# Patient Record
Sex: MALE | Race: BLACK OR AFRICAN AMERICAN | Employment: PART TIME | URBAN - METROPOLITAN AREA
[De-identification: names, ages, dates, MRNs, and addresses within clinical notes are randomized per-mention and may not be internally consistent; named-entity substitution may affect disease eponyms.]

---

## 2022-03-12 ENCOUNTER — HOSPITAL ENCOUNTER (INPATIENT)
Age: 27
LOS: 1 days | Discharge: HOME | DRG: 866 | End: 2022-03-15
Attending: EMERGENCY MEDICINE | Admitting: FAMILY MEDICINE
Payer: COMMERCIAL

## 2022-03-12 ENCOUNTER — APPOINTMENT (OUTPATIENT)
Dept: GENERAL RADIOLOGY | Age: 27
DRG: 866 | End: 2022-03-12
Attending: PHYSICIAN ASSISTANT
Payer: COMMERCIAL

## 2022-03-12 ENCOUNTER — APPOINTMENT (OUTPATIENT)
Dept: CT IMAGING | Age: 27
DRG: 866 | End: 2022-03-12
Attending: PHYSICIAN ASSISTANT
Payer: COMMERCIAL

## 2022-03-12 DIAGNOSIS — R11.2 NAUSEA AND VOMITING, INTRACTABILITY OF VOMITING NOT SPECIFIED, UNSPECIFIED VOMITING TYPE: ICD-10-CM

## 2022-03-12 DIAGNOSIS — R16.1 SPLENOMEGALY: ICD-10-CM

## 2022-03-12 DIAGNOSIS — R74.8 ELEVATED LIVER ENZYMES: ICD-10-CM

## 2022-03-12 DIAGNOSIS — B27.90 INFECTIOUS MONONUCLEOSIS WITHOUT COMPLICATION, INFECTIOUS MONONUCLEOSIS DUE TO UNSPECIFIED ORGANISM: Primary | ICD-10-CM

## 2022-03-12 DIAGNOSIS — R50.9 FEVER, UNSPECIFIED FEVER CAUSE: ICD-10-CM

## 2022-03-12 PROBLEM — R79.89 ELEVATED LFTS: Status: ACTIVE | Noted: 2022-03-12

## 2022-03-12 LAB
ALBUMIN SERPL-MCNC: 3.5 G/DL (ref 3.5–5)
ALBUMIN/GLOB SERPL: 0.9 {RATIO} (ref 1.2–3.5)
ALP SERPL-CCNC: 336 U/L (ref 50–136)
ALT SERPL-CCNC: 665 U/L (ref 12–65)
ANION GAP SERPL CALC-SCNC: 7 MMOL/L (ref 7–16)
APAP SERPL-MCNC: <10 UG/ML (ref 10–30)
AST SERPL-CCNC: 341 U/L (ref 15–37)
BACTERIA URNS QL MICRO: 0 /HPF
BASOPHILS # BLD: 0.3 K/UL (ref 0–0.2)
BASOPHILS NFR BLD: 3 % (ref 0–2)
BILIRUB SERPL-MCNC: 0.8 MG/DL (ref 0.2–1.1)
BUN SERPL-MCNC: 7 MG/DL (ref 6–23)
CALCIUM SERPL-MCNC: 9.3 MG/DL (ref 8.3–10.4)
CASTS URNS QL MICRO: NORMAL /LPF
CHLORIDE SERPL-SCNC: 99 MMOL/L (ref 98–107)
CO2 SERPL-SCNC: 29 MMOL/L (ref 21–32)
CREAT SERPL-MCNC: 0.98 MG/DL (ref 0.8–1.5)
DIFFERENTIAL METHOD BLD: ABNORMAL
EOSINOPHIL # BLD: 0 K/UL (ref 0–0.8)
EOSINOPHIL NFR BLD: 0 % (ref 0.5–7.8)
EPI CELLS #/AREA URNS HPF: 0 /HPF
ERYTHROCYTE [DISTWIDTH] IN BLOOD BY AUTOMATED COUNT: 11.3 % (ref 11.9–14.6)
GLOBULIN SER CALC-MCNC: 4.1 G/DL (ref 2.3–3.5)
GLUCOSE SERPL-MCNC: 110 MG/DL (ref 65–100)
HCT VFR BLD AUTO: 46 % (ref 41.1–50.3)
HGB BLD-MCNC: 15.8 G/DL (ref 13.6–17.2)
IMM GRANULOCYTES # BLD AUTO: 0.1 K/UL (ref 0–0.5)
IMM GRANULOCYTES NFR BLD AUTO: 1 % (ref 0–5)
LIPASE SERPL-CCNC: 85 U/L (ref 73–393)
LYMPHOCYTES # BLD: 6.4 K/UL (ref 0.5–4.6)
LYMPHOCYTES NFR BLD: 59 % (ref 13–44)
MCH RBC QN AUTO: 30.4 PG (ref 26.1–32.9)
MCHC RBC AUTO-ENTMCNC: 34.3 G/DL (ref 31.4–35)
MCV RBC AUTO: 88.5 FL (ref 79.6–97.8)
MONOCYTES # BLD: 1.1 K/UL (ref 0.1–1.3)
MONOCYTES NFR BLD: 10 % (ref 4–12)
NEUTS SEG # BLD: 2.9 K/UL (ref 1.7–8.2)
NEUTS SEG NFR BLD: 27 % (ref 43–78)
NRBC # BLD: 0 K/UL (ref 0–0.2)
PLATELET # BLD AUTO: 267 K/UL (ref 150–450)
PLATELET COMMENTS,PCOM: ADEQUATE
PMV BLD AUTO: 9.1 FL (ref 9.4–12.3)
POTASSIUM SERPL-SCNC: 4 MMOL/L (ref 3.5–5.1)
PROT SERPL-MCNC: 7.6 G/DL (ref 6.3–8.2)
RBC # BLD AUTO: 5.2 M/UL (ref 4.23–5.6)
RBC #/AREA URNS HPF: NORMAL /HPF
RBC MORPH BLD: ABNORMAL
SODIUM SERPL-SCNC: 135 MMOL/L (ref 136–145)
WBC # BLD AUTO: 10.8 K/UL (ref 4.3–11.1)
WBC MORPH BLD: ABNORMAL
WBC URNS QL MICRO: NORMAL /HPF

## 2022-03-12 PROCEDURE — 74011000636 HC RX REV CODE- 636: Performed by: EMERGENCY MEDICINE

## 2022-03-12 PROCEDURE — 81003 URINALYSIS AUTO W/O SCOPE: CPT

## 2022-03-12 PROCEDURE — 74011000258 HC RX REV CODE- 258: Performed by: EMERGENCY MEDICINE

## 2022-03-12 PROCEDURE — G0378 HOSPITAL OBSERVATION PER HR: HCPCS

## 2022-03-12 PROCEDURE — 83690 ASSAY OF LIPASE: CPT

## 2022-03-12 PROCEDURE — 71046 X-RAY EXAM CHEST 2 VIEWS: CPT

## 2022-03-12 PROCEDURE — 74011250636 HC RX REV CODE- 250/636: Performed by: PHYSICIAN ASSISTANT

## 2022-03-12 PROCEDURE — 99285 EMERGENCY DEPT VISIT HI MDM: CPT

## 2022-03-12 PROCEDURE — 74011250636 HC RX REV CODE- 250/636: Performed by: FAMILY MEDICINE

## 2022-03-12 PROCEDURE — 80143 DRUG ASSAY ACETAMINOPHEN: CPT

## 2022-03-12 PROCEDURE — 74011000636 HC RX REV CODE- 636: Performed by: PHYSICIAN ASSISTANT

## 2022-03-12 PROCEDURE — 96374 THER/PROPH/DIAG INJ IV PUSH: CPT

## 2022-03-12 PROCEDURE — 80053 COMPREHEN METABOLIC PANEL: CPT

## 2022-03-12 PROCEDURE — 74011250637 HC RX REV CODE- 250/637: Performed by: FAMILY MEDICINE

## 2022-03-12 PROCEDURE — 85025 COMPLETE CBC W/AUTO DIFF WBC: CPT

## 2022-03-12 PROCEDURE — 74177 CT ABD & PELVIS W/CONTRAST: CPT

## 2022-03-12 PROCEDURE — 96375 TX/PRO/DX INJ NEW DRUG ADDON: CPT

## 2022-03-12 PROCEDURE — 81015 MICROSCOPIC EXAM OF URINE: CPT

## 2022-03-12 RX ORDER — KETOROLAC TROMETHAMINE 15 MG/ML
15 INJECTION, SOLUTION INTRAMUSCULAR; INTRAVENOUS
Status: COMPLETED | OUTPATIENT
Start: 2022-03-12 | End: 2022-03-12

## 2022-03-12 RX ORDER — SODIUM CHLORIDE 0.9 % (FLUSH) 0.9 %
10 SYRINGE (ML) INJECTION
Status: COMPLETED | OUTPATIENT
Start: 2022-03-12 | End: 2022-03-12

## 2022-03-12 RX ORDER — ACETAMINOPHEN 325 MG/1
650 TABLET ORAL
Status: DISCONTINUED | OUTPATIENT
Start: 2022-03-12 | End: 2022-03-15 | Stop reason: HOSPADM

## 2022-03-12 RX ORDER — ONDANSETRON 4 MG/1
4 TABLET, ORALLY DISINTEGRATING ORAL
Status: DISCONTINUED | OUTPATIENT
Start: 2022-03-12 | End: 2022-03-15 | Stop reason: HOSPADM

## 2022-03-12 RX ORDER — POLYETHYLENE GLYCOL 3350 17 G/17G
17 POWDER, FOR SOLUTION ORAL DAILY PRN
Status: DISCONTINUED | OUTPATIENT
Start: 2022-03-12 | End: 2022-03-15 | Stop reason: HOSPADM

## 2022-03-12 RX ORDER — IBUPROFEN 400 MG/1
400 TABLET ORAL
Status: DISCONTINUED | OUTPATIENT
Start: 2022-03-12 | End: 2022-03-12

## 2022-03-12 RX ORDER — MORPHINE SULFATE 4 MG/ML
4 INJECTION INTRAVENOUS ONCE
Status: COMPLETED | OUTPATIENT
Start: 2022-03-12 | End: 2022-03-12

## 2022-03-12 RX ORDER — SODIUM CHLORIDE 0.9 % (FLUSH) 0.9 %
5-10 SYRINGE (ML) INJECTION EVERY 8 HOURS
Status: DISCONTINUED | OUTPATIENT
Start: 2022-03-12 | End: 2022-03-15 | Stop reason: HOSPADM

## 2022-03-12 RX ORDER — HYDROCODONE BITARTRATE AND ACETAMINOPHEN 5; 325 MG/1; MG/1
1 TABLET ORAL
Status: DISCONTINUED | OUTPATIENT
Start: 2022-03-12 | End: 2022-03-15 | Stop reason: HOSPADM

## 2022-03-12 RX ORDER — SODIUM CHLORIDE 0.9 % (FLUSH) 0.9 %
5-40 SYRINGE (ML) INJECTION EVERY 8 HOURS
Status: DISCONTINUED | OUTPATIENT
Start: 2022-03-12 | End: 2022-03-15 | Stop reason: HOSPADM

## 2022-03-12 RX ORDER — MORPHINE SULFATE 2 MG/ML
1 INJECTION, SOLUTION INTRAMUSCULAR; INTRAVENOUS
Status: DISCONTINUED | OUTPATIENT
Start: 2022-03-12 | End: 2022-03-13

## 2022-03-12 RX ORDER — ONDANSETRON 2 MG/ML
4 INJECTION INTRAMUSCULAR; INTRAVENOUS
Status: DISCONTINUED | OUTPATIENT
Start: 2022-03-12 | End: 2022-03-15 | Stop reason: HOSPADM

## 2022-03-12 RX ORDER — SODIUM CHLORIDE 0.9 % (FLUSH) 0.9 %
5-10 SYRINGE (ML) INJECTION AS NEEDED
Status: DISCONTINUED | OUTPATIENT
Start: 2022-03-12 | End: 2022-03-15 | Stop reason: HOSPADM

## 2022-03-12 RX ORDER — SODIUM CHLORIDE 0.9 % (FLUSH) 0.9 %
5-40 SYRINGE (ML) INJECTION AS NEEDED
Status: DISCONTINUED | OUTPATIENT
Start: 2022-03-12 | End: 2022-03-15 | Stop reason: HOSPADM

## 2022-03-12 RX ORDER — SODIUM CHLORIDE 9 MG/ML
125 INJECTION, SOLUTION INTRAVENOUS CONTINUOUS
Status: DISCONTINUED | OUTPATIENT
Start: 2022-03-12 | End: 2022-03-13

## 2022-03-12 RX ORDER — ACETAMINOPHEN 650 MG/1
650 SUPPOSITORY RECTAL
Status: DISCONTINUED | OUTPATIENT
Start: 2022-03-12 | End: 2022-03-15 | Stop reason: HOSPADM

## 2022-03-12 RX ORDER — ONDANSETRON 2 MG/ML
4 INJECTION INTRAMUSCULAR; INTRAVENOUS ONCE
Status: COMPLETED | OUTPATIENT
Start: 2022-03-12 | End: 2022-03-12

## 2022-03-12 RX ADMIN — SODIUM CHLORIDE 1000 ML: 900 INJECTION, SOLUTION INTRAVENOUS at 20:40

## 2022-03-12 RX ADMIN — HYDROCODONE BITARTRATE AND ACETAMINOPHEN 1 TABLET: 5; 325 TABLET ORAL at 23:05

## 2022-03-12 RX ADMIN — DIATRIZOATE MEGLUMINE AND DIATRIZOATE SODIUM 15 ML: 660; 100 LIQUID ORAL; RECTAL at 18:31

## 2022-03-12 RX ADMIN — MORPHINE SULFATE 4 MG: 4 INJECTION INTRAVENOUS at 21:01

## 2022-03-12 RX ADMIN — KETOROLAC TROMETHAMINE 15 MG: 15 INJECTION, SOLUTION INTRAMUSCULAR; INTRAVENOUS at 18:09

## 2022-03-12 RX ADMIN — SODIUM CHLORIDE 100 ML: 9 INJECTION, SOLUTION INTRAVENOUS at 19:39

## 2022-03-12 RX ADMIN — SODIUM CHLORIDE 125 ML/HR: 900 INJECTION, SOLUTION INTRAVENOUS at 22:40

## 2022-03-12 RX ADMIN — IOPAMIDOL 100 ML: 755 INJECTION, SOLUTION INTRAVENOUS at 19:39

## 2022-03-12 RX ADMIN — SODIUM CHLORIDE 1000 ML: 900 INJECTION, SOLUTION INTRAVENOUS at 18:09

## 2022-03-12 RX ADMIN — ONDANSETRON 4 MG: 2 INJECTION INTRAMUSCULAR; INTRAVENOUS at 18:09

## 2022-03-12 RX ADMIN — Medication 10 ML: at 19:39

## 2022-03-12 NOTE — ED TRIAGE NOTES
Pt states he was dx with mono on Saturday and has been vomiting and unable to keep food or liquids down since yesterday. Still having a fever and body aches. Went to  yesterday and given Zofran and NS 2000mL. Masked for triage.

## 2022-03-12 NOTE — ED PROVIDER NOTES
Patient presents to the emergency department stating that he has been feeling poorly for about 8 days. He states that he has had a fever for 1 week, sore throat and body aches for 6 days as well as headache. He was seen at a [facility] clinic and had a positive mononucleosis test about a week ago. Because he was not feeling better he went to [facility] urgent care yesterday and they retested him and he was once again positive. He states he has been having vomiting over the past 5 days numerous episodes and the urgent care yesterday gave him 2 L of fluid and a prescription for Zofran. He last took Zofran at 8:30 AM which has helped and he has not had any vomiting today but states he is having some worsening abdominal pain, headache and general body aches. Patient admits he has been taking a gram of Tylenol every 5-6 hours over the last 1 week. No past medical history on file. No past surgical history on file. No family history on file. Social History     Socioeconomic History    Marital status: SINGLE     Spouse name: Not on file    Number of children: Not on file    Years of education: Not on file    Highest education level: Not on file   Occupational History    Not on file   Tobacco Use    Smoking status: Not on file    Smokeless tobacco: Not on file   Substance and Sexual Activity    Alcohol use: Not on file    Drug use: Not on file    Sexual activity: Not on file   Other Topics Concern    Not on file   Social History Narrative    Not on file     Social Determinants of Health     Financial Resource Strain:     Difficulty of Paying Living Expenses: Not on file   Food Insecurity:     Worried About Running Out of Food in the Last Year: Not on file    Keri of Food in the Last Year: Not on file   Transportation Needs:     Lack of Transportation (Medical): Not on file    Lack of Transportation (Non-Medical):  Not on file   Physical Activity:     Days of Exercise per Week: Not on file   WTFast of Exercise per Session: Not on file   Stress:     Feeling of Stress : Not on file   Social Connections:     Frequency of Communication with Friends and Family: Not on file    Frequency of Social Gatherings with Friends and Family: Not on file    Attends Judaism Services: Not on file    Active Member of Clubs or Organizations: Not on file    Attends Club or Organization Meetings: Not on file    Marital Status: Not on file   Intimate Partner Violence:     Fear of Current or Ex-Partner: Not on file    Emotionally Abused: Not on file    Physically Abused: Not on file    Sexually Abused: Not on file   Housing Stability:     Unable to Pay for Housing in the Last Year: Not on file    Number of [place] in the Last Year: Not on file    Unstable Housing in the Last Year: Not on file         ALLERGIES: Patient has no known allergies. Review of Systems   Constitutional: Positive for chills, fatigue and fever. HENT: Positive for sore throat. Gastrointestinal: Positive for abdominal pain, nausea and vomiting. Neurological: Positive for headaches. All other systems reviewed and are negative. Vitals:    03/12/22 1712   BP: 109/71   Pulse: 100   Resp: 20   Temp: (!) 101 °F (38.3 °C)   SpO2: 95%   Weight: 62.6 kg (138 lb)   Height: 5' 10\" (1.778 m)            Physical Exam  Vitals and nursing note reviewed. Constitutional:       Appearance: Normal appearance. HENT:      Head: Normocephalic and atraumatic. Right Ear: Tympanic membrane normal.      Left Ear: Tympanic membrane normal.      Nose: Nose normal.      Mouth/Throat:      Mouth: Mucous membranes are dry. Comments: Patient has moderate pharyngeal erythema and mild tonsillar swelling no obvious exudates noted. Eyes:      Extraocular Movements: Extraocular movements intact. Conjunctiva/sclera: Conjunctivae normal.      Pupils: Pupils are equal, round, and reactive to light.    Cardiovascular:      Rate and Rhythm: Regular rhythm. Tachycardia present. Pulses: Normal pulses. Heart sounds: Normal heart sounds. Pulmonary:      Effort: Pulmonary effort is normal.      Breath sounds: Normal breath sounds. Abdominal:      General: Abdomen is flat. There is no distension. Palpations: Abdomen is soft. Tenderness: There is no guarding. Comments: Minimal generalized abdominal tenderness to palpation. No palpable hepatomegaly or splenomegaly   Musculoskeletal:         General: Normal range of motion. Cervical back: Normal range of motion and neck supple. Skin:     General: Skin is warm and dry. Capillary Refill: Capillary refill takes less than 2 seconds. Neurological:      General: No focal deficit present. Mental Status: He is alert. Psychiatric:         Mood and Affect: Mood normal.         Behavior: Behavior normal.         Thought Content: Thought content normal.          MDM  Number of Diagnoses or Management Options  Elevated liver enzymes  Fever, unspecified fever cause  Infectious mononucleosis without complication, infectious mononucleosis due to unspecified organism  Nausea and vomiting, intractability of vomiting not specified, unspecified vomiting type  Splenomegaly  Diagnosis management comments: Differential diagnoses: Mononucleosis, dehydration, hepatomegaly, splenomegaly, viral hepatitis    Patient's liver enzymes are quite elevated. I CT scan his abdomen which shows mild splenomegaly but normal liver. I reached out to on-call GI through [facility] and Dr. Pablo Thomas states that he does not feel GI needs to be involved but does recommend reaching out to general surgery in case the spleno megaly worsens as he could need a splenectomy. He feels that patient could be admitted to the hospitalist with pain meds and hydration as needed.   I do feel that patient would benefit from admission for better pain control since he has been taking too much Tylenol and for fluids as he has had a lot of nausea and vomiting.   There was a questionable pneumonia versus atelectasis on his chest CT scan at the base of his right lung so then a 2 view chest x-ray was obtained which is normal.       Amount and/or Complexity of Data Reviewed  Clinical lab tests: reviewed  Tests in the radiology section of CPT®: reviewed    Risk of Complications, Morbidity, and/or Mortality  Presenting problems: moderate  Diagnostic procedures: moderate  Management options: moderate    Patient Progress  Patient progress: improved         Procedures

## 2022-03-13 PROBLEM — R74.01 ELEVATED TRANSAMINASE LEVEL: Status: ACTIVE | Noted: 2022-03-12

## 2022-03-13 LAB
ALBUMIN SERPL-MCNC: 3.1 G/DL (ref 3.5–5)
ALBUMIN/GLOB SERPL: 0.9 {RATIO} (ref 1.2–3.5)
ALP SERPL-CCNC: 282 U/L (ref 50–136)
ALT SERPL-CCNC: 531 U/L (ref 12–65)
ANION GAP SERPL CALC-SCNC: 5 MMOL/L (ref 7–16)
AST SERPL-CCNC: 247 U/L (ref 15–37)
BASOPHILS # BLD: 0.2 K/UL (ref 0–0.2)
BASOPHILS NFR BLD: 2 % (ref 0–2)
BILIRUB SERPL-MCNC: 0.5 MG/DL (ref 0.2–1.1)
BUN SERPL-MCNC: 7 MG/DL (ref 6–23)
CALCIUM SERPL-MCNC: 8.9 MG/DL (ref 8.3–10.4)
CHLORIDE SERPL-SCNC: 105 MMOL/L (ref 98–107)
CO2 SERPL-SCNC: 26 MMOL/L (ref 21–32)
CREAT SERPL-MCNC: 0.83 MG/DL (ref 0.8–1.5)
DIFFERENTIAL METHOD BLD: ABNORMAL
EOSINOPHIL # BLD: 0 K/UL (ref 0–0.8)
EOSINOPHIL NFR BLD: 0 % (ref 0.5–7.8)
ERYTHROCYTE [DISTWIDTH] IN BLOOD BY AUTOMATED COUNT: 11.4 % (ref 11.9–14.6)
GLOBULIN SER CALC-MCNC: 3.5 G/DL (ref 2.3–3.5)
GLUCOSE SERPL-MCNC: 97 MG/DL (ref 65–100)
HAV IGM SER QL: NONREACTIVE
HBV SURFACE AG SER QL: NONREACTIVE
HCT VFR BLD AUTO: 43.1 % (ref 41.1–50.3)
HGB BLD-MCNC: 14.8 G/DL (ref 13.6–17.2)
HIV 1+2 AB+HIV1 P24 AG SERPL QL IA: NONREACTIVE
HIV12 RESULT COMMENT, HHIVC: ABNORMAL
IMM GRANULOCYTES # BLD AUTO: 0 K/UL (ref 0–0.5)
IMM GRANULOCYTES NFR BLD AUTO: 0 % (ref 0–5)
LYMPHOCYTES # BLD: 5 K/UL (ref 0.5–4.6)
LYMPHOCYTES NFR BLD: 53 % (ref 13–44)
MCH RBC QN AUTO: 30.4 PG (ref 26.1–32.9)
MCHC RBC AUTO-ENTMCNC: 34.3 G/DL (ref 31.4–35)
MCV RBC AUTO: 88.5 FL (ref 79.6–97.8)
MONOCYTES # BLD: 1.7 K/UL (ref 0.1–1.3)
MONOCYTES NFR BLD: 18 % (ref 4–12)
NEUTS SEG # BLD: 2.6 K/UL (ref 1.7–8.2)
NEUTS SEG NFR BLD: 27 % (ref 43–78)
NRBC # BLD: 0 K/UL (ref 0–0.2)
PLATELET # BLD AUTO: 237 K/UL (ref 150–450)
PLATELET COMMENTS,PCOM: ADEQUATE
PMV BLD AUTO: 9.2 FL (ref 9.4–12.3)
POTASSIUM SERPL-SCNC: 3.9 MMOL/L (ref 3.5–5.1)
PROT SERPL-MCNC: 6.6 G/DL (ref 6.3–8.2)
RBC # BLD AUTO: 4.87 M/UL (ref 4.23–5.6)
RBC MORPH BLD: ABNORMAL
SODIUM SERPL-SCNC: 136 MMOL/L (ref 136–145)
WBC # BLD AUTO: 9.5 K/UL (ref 4.3–11.1)
WBC MORPH BLD: ABNORMAL

## 2022-03-13 PROCEDURE — 86709 HEPATITIS A IGM ANTIBODY: CPT

## 2022-03-13 PROCEDURE — 82787 IGG 1 2 3 OR 4 EACH: CPT

## 2022-03-13 PROCEDURE — 82977 ASSAY OF GGT: CPT

## 2022-03-13 PROCEDURE — 86038 ANTINUCLEAR ANTIBODIES: CPT

## 2022-03-13 PROCEDURE — 86015 ACTIN ANTIBODY EACH: CPT

## 2022-03-13 PROCEDURE — 86803 HEPATITIS C AB TEST: CPT

## 2022-03-13 PROCEDURE — 87340 HEPATITIS B SURFACE AG IA: CPT

## 2022-03-13 PROCEDURE — 74011250636 HC RX REV CODE- 250/636: Performed by: FAMILY MEDICINE

## 2022-03-13 PROCEDURE — 36415 COLL VENOUS BLD VENIPUNCTURE: CPT

## 2022-03-13 PROCEDURE — G0378 HOSPITAL OBSERVATION PER HR: HCPCS

## 2022-03-13 PROCEDURE — 80053 COMPREHEN METABOLIC PANEL: CPT

## 2022-03-13 PROCEDURE — 87389 HIV-1 AG W/HIV-1&-2 AB AG IA: CPT

## 2022-03-13 PROCEDURE — 74011250637 HC RX REV CODE- 250/637: Performed by: FAMILY MEDICINE

## 2022-03-13 PROCEDURE — 74011000250 HC RX REV CODE- 250: Performed by: EMERGENCY MEDICINE

## 2022-03-13 PROCEDURE — 96376 TX/PRO/DX INJ SAME DRUG ADON: CPT

## 2022-03-13 PROCEDURE — 86308 HETEROPHILE ANTIBODY SCREEN: CPT

## 2022-03-13 PROCEDURE — 2709999900 HC NON-CHARGEABLE SUPPLY

## 2022-03-13 PROCEDURE — 74011000250 HC RX REV CODE- 250: Performed by: FAMILY MEDICINE

## 2022-03-13 PROCEDURE — 86381 MITOCHONDRIAL ANTIBODY EACH: CPT

## 2022-03-13 PROCEDURE — 85025 COMPLETE CBC W/AUTO DIFF WBC: CPT

## 2022-03-13 RX ORDER — DIPHENHYDRAMINE HCL 25 MG
25 CAPSULE ORAL
Status: DISCONTINUED | OUTPATIENT
Start: 2022-03-13 | End: 2022-03-15

## 2022-03-13 RX ORDER — MORPHINE SULFATE 2 MG/ML
2 INJECTION, SOLUTION INTRAMUSCULAR; INTRAVENOUS
Status: DISCONTINUED | OUTPATIENT
Start: 2022-03-13 | End: 2022-03-15

## 2022-03-13 RX ORDER — SODIUM CHLORIDE, SODIUM LACTATE, POTASSIUM CHLORIDE, CALCIUM CHLORIDE 600; 310; 30; 20 MG/100ML; MG/100ML; MG/100ML; MG/100ML
125 INJECTION, SOLUTION INTRAVENOUS CONTINUOUS
Status: DISCONTINUED | OUTPATIENT
Start: 2022-03-13 | End: 2022-03-15 | Stop reason: HOSPADM

## 2022-03-13 RX ORDER — KETOROLAC TROMETHAMINE 15 MG/ML
15 INJECTION, SOLUTION INTRAMUSCULAR; INTRAVENOUS
Status: DISCONTINUED | OUTPATIENT
Start: 2022-03-13 | End: 2022-03-15

## 2022-03-13 RX ORDER — IBUPROFEN 600 MG/1
600 TABLET ORAL
Status: DISCONTINUED | OUTPATIENT
Start: 2022-03-13 | End: 2022-03-15 | Stop reason: HOSPADM

## 2022-03-13 RX ADMIN — MORPHINE SULFATE 2 MG: 2 INJECTION, SOLUTION INTRAMUSCULAR; INTRAVENOUS at 16:52

## 2022-03-13 RX ADMIN — KETOROLAC TROMETHAMINE 15 MG: 15 INJECTION, SOLUTION INTRAMUSCULAR; INTRAVENOUS at 11:32

## 2022-03-13 RX ADMIN — MORPHINE SULFATE 1 MG: 2 INJECTION, SOLUTION INTRAMUSCULAR; INTRAVENOUS at 09:12

## 2022-03-13 RX ADMIN — SODIUM CHLORIDE, SODIUM LACTATE, POTASSIUM CHLORIDE, AND CALCIUM CHLORIDE 125 ML/HR: 600; 310; 30; 20 INJECTION, SOLUTION INTRAVENOUS at 22:59

## 2022-03-13 RX ADMIN — ONDANSETRON 4 MG: 2 INJECTION INTRAMUSCULAR; INTRAVENOUS at 22:51

## 2022-03-13 RX ADMIN — SODIUM CHLORIDE 125 ML/HR: 900 INJECTION, SOLUTION INTRAVENOUS at 11:48

## 2022-03-13 RX ADMIN — ACETAMINOPHEN 650 MG: 325 TABLET, FILM COATED ORAL at 19:58

## 2022-03-13 RX ADMIN — KETOROLAC TROMETHAMINE 15 MG: 15 INJECTION, SOLUTION INTRAMUSCULAR; INTRAVENOUS at 22:51

## 2022-03-13 RX ADMIN — DIPHENHYDRAMINE HYDROCHLORIDE 25 MG: 25 CAPSULE ORAL at 01:54

## 2022-03-13 RX ADMIN — MORPHINE SULFATE 1 MG: 2 INJECTION, SOLUTION INTRAMUSCULAR; INTRAVENOUS at 01:54

## 2022-03-13 RX ADMIN — SODIUM CHLORIDE, PRESERVATIVE FREE 10 ML: 5 INJECTION INTRAVENOUS at 15:06

## 2022-03-13 RX ADMIN — KETOROLAC TROMETHAMINE 15 MG: 15 INJECTION, SOLUTION INTRAMUSCULAR; INTRAVENOUS at 05:05

## 2022-03-13 RX ADMIN — MORPHINE SULFATE 2 MG: 2 INJECTION, SOLUTION INTRAMUSCULAR; INTRAVENOUS at 19:58

## 2022-03-13 RX ADMIN — HYDROCODONE BITARTRATE AND ACETAMINOPHEN 1 TABLET: 5; 325 TABLET ORAL at 18:12

## 2022-03-13 RX ADMIN — SODIUM CHLORIDE, SODIUM LACTATE, POTASSIUM CHLORIDE, AND CALCIUM CHLORIDE 125 ML/HR: 600; 310; 30; 20 INJECTION, SOLUTION INTRAVENOUS at 15:34

## 2022-03-13 RX ADMIN — HYDROCODONE BITARTRATE AND ACETAMINOPHEN 1 TABLET: 5; 325 TABLET ORAL at 11:33

## 2022-03-13 RX ADMIN — PHENOL 1 SPRAY: 1.5 LIQUID ORAL at 05:05

## 2022-03-13 RX ADMIN — MORPHINE SULFATE 2 MG: 2 INJECTION, SOLUTION INTRAMUSCULAR; INTRAVENOUS at 13:47

## 2022-03-13 RX ADMIN — ONDANSETRON 4 MG: 2 INJECTION INTRAMUSCULAR; INTRAVENOUS at 11:45

## 2022-03-13 RX ADMIN — KETOROLAC TROMETHAMINE 15 MG: 15 INJECTION, SOLUTION INTRAMUSCULAR; INTRAVENOUS at 18:12

## 2022-03-13 RX ADMIN — SODIUM CHLORIDE 125 ML/HR: 900 INJECTION, SOLUTION INTRAVENOUS at 05:18

## 2022-03-13 RX ADMIN — NYSTATIN 5 ML: 100000 SUSPENSION ORAL at 05:12

## 2022-03-13 RX ADMIN — ONDANSETRON 4 MG: 2 INJECTION INTRAMUSCULAR; INTRAVENOUS at 18:12

## 2022-03-13 RX ADMIN — MORPHINE SULFATE 2 MG: 2 INJECTION, SOLUTION INTRAMUSCULAR; INTRAVENOUS at 22:33

## 2022-03-13 NOTE — PROGRESS NOTES
Medicated with Benadryl 25 mg PO as requested for itching and Morphine 1 mg IV as requested for pain, see MAR.

## 2022-03-13 NOTE — ASSESSMENT & PLAN NOTE
Due to infective mono  - IVFs  - Zofran prn  - Full liquid diet, advance as tolerated    3/14: currently intermittently controlled, fevers, significant swelling of throat, difficulty swallowing medications

## 2022-03-13 NOTE — PROGRESS NOTES
TRANSFER - IN REPORT:    Verbal report received from Sylvia Back RN on Viann Haugan  being received from ER for routine progression of care      Report consisted of patients Situation, Background, Assessment and   Recommendations(SBAR). Information from the following report(s) Kardex, ED Summary and Recent Results was reviewed with the receiving nurse. Opportunity for questions and clarification was provided. Assessment completed upon patients arrival to unit and care assumed.

## 2022-03-13 NOTE — ED NOTES
TRANSFER - OUT REPORT:    Verbal report given to Mayelin(name) on Terrell Nair  being transferred to [facility](unit) for routine progression of care       Report consisted of patients Situation, Background, Assessment and   Recommendations(SBAR). Information from the following report(s) SBAR, ED Summary and MAR was reviewed with the receiving nurse. Lines:   Peripheral IV 03/12/22 Right Antecubital (Active)   Site Assessment Clean, dry, & intact 03/12/22 1718   Phlebitis Assessment 0 03/12/22 1718   Infiltration Assessment 0 03/12/22 1718   Dressing Status Clean, dry, & intact 03/12/22 1718   Dressing Type Tape;Transparent 03/12/22 1718   Hub Color/Line Status Pink;Flushed;Patent 03/12/22 1718   Action Taken Blood drawn 03/12/22 1718        Opportunity for questions and clarification was provided.       Patient transported with:   RN

## 2022-03-13 NOTE — PROGRESS NOTES
Hospitalist Progress Note   Admit Date:  3/12/2022  5:13 PM   Name:  Husam Albrecht   Age:  27 y.o. Sex:  male  :  1995   MRN:  [id number]   Room:  [facility]    Presenting Complaint: Nausea    Reason(s) for Admission: Intractable nausea and vomiting [R11.2]  Mononucleosis [B27.90]     Hospital Course & Interval History:   26M PMHx recent diagnosis of mononucleosis who presented to ED with intractable nausea/vomiting. Patient was diagnosed with mono last Saturday. For the past 24 hours, he has not been able to keep any food/liquid down. He reports myalgias and subjective fevers. Upon ER workup, WBC is WNL. LFTs are somewhat elevated. CT shows expected mild splenomegaly. GI was consulted by ER, who advise this is not out of the realm of expected for mono and only would recommend General Surgery follow up if splenomegaly worsened. Given his persistent N/V, Hospitalist asked to admit. Will observe overnight for pain and nausea treatment. Subjective/24hr Events (22): Notable submandibular tender lymph nodes. Nausea, vomiting, pain intermittently controlled throughout the day. No personal history of autoimmune disease. Some possible unclear process with history of cholecystectomy at age 17. Transaminase level declining. ROS:  10 systems reviewed and negative except as noted above.      Assessment & Plan:   * Intractable nausea and vomiting  Due to infective mono  - IVFs  - Zofran prn  - Full liquid diet, advance as tolerated    3/13: currently intermittently controlled    Mononucleosis  - Supportive care  - Advised against any contact sports/activities  - CT with mild splenomegaly as expected    Elevated transaminase level  Admission , , ALKP 336; GI consulted by ER, no interventions needed at this time  - Recheck CMP in AM  - autoimmune, hepatitis labs    3/13: improving, additional labs        Dispo/Discharge Planning:      Home in 1-2 days    Diet:  ADULT DIET Regular  DVT PPx: ambulatory  Code status: Full Code    Hospital Problems as of 3/13/2022 Never Reviewed          Codes Class Noted - Resolved POA    Mononucleosis ICD-10-CM: B27.90  ICD-9-CM: 566  3/12/2022 - Present Unknown        * (Principal) Intractable nausea and vomiting ICD-10-CM: R11.2  ICD-9-CM: 536.2  3/12/2022 - Present Unknown        Elevated LFTs ICD-10-CM: R79.89  ICD-9-CM: 790.6  3/12/2022 - Present Unknown              Objective:     Patient Vitals for the past 24 hrs:   Temp Pulse Resp BP SpO2   03/13/22 1140 (!) 100.8 °F (38.2 °C) 93 20 123/77 95 %   03/13/22 0747 99.9 °F (37.7 °C) 86 17 108/63 97 %   03/13/22 0603 98.2 °F (36.8 °C) 87 18 117/72 97 %   03/13/22 0052 98.7 °F (37.1 °C) 94 18 117/74 99 %   03/12/22 2125 99.5 °F (37.5 °C) 89 18 129/68 99 %   03/12/22 2100 -- 87 -- -- 96 %   03/12/22 2059 -- -- -- 131/65 --   03/12/22 1712 (!) 101 °F (38.3 °C) 100 20 109/71 95 %     Oxygen Therapy  O2 Sat (%): 95 % (03/13/22 1140)  Pulse via Oximetry: 87 beats per minute (03/12/22 2100)  O2 Device: None (Room air) (03/13/22 1140)    Estimated body mass index is 19.8 kg/m² as calculated from the following:    Height as of this encounter: 5' 10\" (1.778 m). Weight as of this encounter: 62.6 kg (138 lb). No intake or output data in the 24 hours ending 03/13/22 1421      Blood pressure 123/77, pulse 93, temperature (!) 100.8 °F (38.2 °C), resp. rate 20, height 5' 10\" (1.778 m), weight 62.6 kg (138 lb), SpO2 95 %. Physical Exam  Vitals and nursing note reviewed. Constitutional:       General: He is not in acute distress. Appearance: Normal appearance. Comments: Affable   HENT:      Head: Normocephalic. Mouth/Throat:      Comments: Submandibular lymph nodes  Eyes:      Extraocular Movements: Extraocular movements intact. Cardiovascular:      Rate and Rhythm: Normal rate and regular rhythm. Pulses:           Radial pulses are 2+ on the left side.    Pulmonary:      Effort: Pulmonary effort is normal. No respiratory distress. Abdominal:      General: There is no distension. Palpations: Abdomen is soft. Tenderness: There is abdominal tenderness. Musculoskeletal:         General: No deformity. Cervical back: No rigidity. Skin:     General: Skin is warm and dry. Neurological:      General: No focal deficit present. Mental Status: He is alert and oriented to person, place, and time. Psychiatric:         Mood and Affect: Mood normal.         Behavior: Behavior normal.         I have reviewed ordered lab tests and independently visualized imaging below:    Recent Labs:  Recent Results (from the past 48 hour(s))   CBC WITH AUTOMATED DIFF    Collection Time: 03/12/22  5:21 PM   Result Value Ref Range    WBC 10.8 4.3 - 11.1 K/uL    RBC 5.20 4.23 - 5.6 M/uL    HGB 15.8 13.6 - 17.2 g/dL    HCT 46.0 41.1 - 50.3 %    MCV 88.5 79.6 - 97.8 FL    MCH 30.4 26.1 - 32.9 PG    MCHC 34.3 31.4 - 35.0 g/dL    RDW 11.3 (L) 11.9 - 14.6 %    PLATELET 569 343 - 745 K/uL    MPV 9.1 (L) 9.4 - 12.3 FL    ABSOLUTE NRBC 0.00 0.0 - 0.2 K/uL    NEUTROPHILS 27 (L) 43 - 78 %    LYMPHOCYTES 59 (H) 13 - 44 %    MONOCYTES 10 4.0 - 12.0 %    EOSINOPHILS 0 (L) 0.5 - 7.8 %    BASOPHILS 3 (H) 0.0 - 2.0 %    IMMATURE GRANULOCYTES 1 0.0 - 5.0 %    ABS. NEUTROPHILS 2.9 1.7 - 8.2 K/UL    ABS. LYMPHOCYTES 6.4 (H) 0.5 - 4.6 K/UL    ABS. MONOCYTES 1.1 0.1 - 1.3 K/UL    ABS. EOSINOPHILS 0.0 0.0 - 0.8 K/UL    ABS. BASOPHILS 0.3 (H) 0.0 - 0.2 K/UL    ABS. IMM.  GRANS. 0.1 0.0 - 0.5 K/UL    RBC COMMENTS NORMOCYTIC/NORMOCHROMIC      WBC COMMENTS Result Confirmed By Smear      PLATELET COMMENTS ADEQUATE      DF MANUAL     METABOLIC PANEL, COMPREHENSIVE    Collection Time: 03/12/22  5:21 PM   Result Value Ref Range    Sodium 135 (L) 136 - 145 mmol/L    Potassium 4.0 3.5 - 5.1 mmol/L    Chloride 99 98 - 107 mmol/L    CO2 29 21 - 32 mmol/L    Anion gap 7 7 - 16 mmol/L    Glucose 110 (H) 65 - 100 mg/dL    BUN 7 6 - 23 MG/DL Creatinine 0.98 0.8 - 1.5 MG/DL    GFR est AA >60 >60 ml/min/1.73m2    GFR est non-AA >60 >60 ml/min/1.73m2    Calcium 9.3 8.3 - 10.4 MG/DL    Bilirubin, total 0.8 0.2 - 1.1 MG/DL    ALT (SGPT) 665 (H) 12 - 65 U/L    AST (SGOT) 341 (H) 15 - 37 U/L    Alk. phosphatase 336 (H) 50 - 136 U/L    Protein, total 7.6 6.3 - 8.2 g/dL    Albumin 3.5 3.5 - 5.0 g/dL    Globulin 4.1 (H) 2.3 - 3.5 g/dL    A-G Ratio 0.9 (L) 1.2 - 3.5     LIPASE    Collection Time: 03/12/22  5:21 PM   Result Value Ref Range    Lipase 85 73 - 393 U/L   ACETAMINOPHEN    Collection Time: 03/12/22  5:21 PM   Result Value Ref Range    Acetaminophen level <10 (L) 10.0 - 30 ug/mL   URINE MICROSCOPIC    Collection Time: 03/12/22  5:32 PM   Result Value Ref Range    WBC 0-3 0 /hpf    RBC 5-10 0 /hpf    Epithelial cells 0 0 /hpf    Bacteria 0 0 /hpf    Casts 0-3 0 /lpf   METABOLIC PANEL, COMPREHENSIVE    Collection Time: 03/13/22  3:49 AM   Result Value Ref Range    Sodium 136 136 - 145 mmol/L    Potassium 3.9 3.5 - 5.1 mmol/L    Chloride 105 98 - 107 mmol/L    CO2 26 21 - 32 mmol/L    Anion gap 5 (L) 7 - 16 mmol/L    Glucose 97 65 - 100 mg/dL    BUN 7 6 - 23 MG/DL    Creatinine 0.83 0.8 - 1.5 MG/DL    GFR est AA >60 >60 ml/min/1.73m2    GFR est non-AA >60 >60 ml/min/1.73m2    Calcium 8.9 8.3 - 10.4 MG/DL    Bilirubin, total 0.5 0.2 - 1.1 MG/DL    ALT (SGPT) 531 (H) 12 - 65 U/L    AST (SGOT) 247 (H) 15 - 37 U/L    Alk.  phosphatase 282 (H) 50 - 136 U/L    Protein, total 6.6 6.3 - 8.2 g/dL    Albumin 3.1 (L) 3.5 - 5.0 g/dL    Globulin 3.5 2.3 - 3.5 g/dL    A-G Ratio 0.9 (L) 1.2 - 3.5     CBC WITH AUTOMATED DIFF    Collection Time: 03/13/22  3:49 AM   Result Value Ref Range    WBC 9.5 4.3 - 11.1 K/uL    RBC 4.87 4.23 - 5.6 M/uL    HGB 14.8 13.6 - 17.2 g/dL    HCT 43.1 41.1 - 50.3 %    MCV 88.5 79.6 - 97.8 FL    MCH 30.4 26.1 - 32.9 PG    MCHC 34.3 31.4 - 35.0 g/dL    RDW 11.4 (L) 11.9 - 14.6 %    PLATELET 106 106 - 971 K/uL    MPV 9.2 (L) 9.4 - 12.3 FL ABSOLUTE NRBC 0.00 0.0 - 0.2 K/uL    NEUTROPHILS 27 (L) 43 - 78 %    LYMPHOCYTES 53 (H) 13 - 44 %    MONOCYTES 18 (H) 4.0 - 12.0 %    EOSINOPHILS 0 (L) 0.5 - 7.8 %    BASOPHILS 2 0.0 - 2.0 %    IMMATURE GRANULOCYTES 0 0.0 - 5.0 %    ABS. NEUTROPHILS 2.6 1.7 - 8.2 K/UL    ABS. LYMPHOCYTES 5.0 (H) 0.5 - 4.6 K/UL    ABS. MONOCYTES 1.7 (H) 0.1 - 1.3 K/UL    ABS. EOSINOPHILS 0.0 0.0 - 0.8 K/UL    ABS. BASOPHILS 0.2 0.0 - 0.2 K/UL    ABS. IMM. GRANS. 0.0 0.0 - 0.5 K/UL    RBC COMMENTS NORMOCYTIC/NORMOCHROMIC      WBC COMMENTS Result Confirmed By Smear      PLATELET COMMENTS ADEQUATE      DF AUTOMATED         All Micro Results     None          Other Studies:  XR CHEST PA LAT    Result Date: 3/12/2022  EXAM: Chest x-ray. INDICATION: Dyspnea. COMPARISON: None. TECHNIQUE: Frontal and lateral view chest x-ray. FINDINGS: The lungs are clear. The cardiac size, mediastinal contour and pulmonary vasculature are normal. No pneumothorax or pleural effusion is seen. The bony structures are within normal limits. Normal chest x-ray. CT ABD PELV W CONT    Result Date: 3/12/2022  EXAM: CT abdomen and pelvis with IV contrast. INDICATION: Abdominal pain. COMPARISON: None. TECHNIQUE: Axial CT images of the abdomen and pelvis were obtained after the intravenous injection of 100 mL Isovue 370 CT contrast. Oral contrast was administered as well. Radiation dose reduction techniques were used for this study. Our CT scanners use one or all of the following:  Automated exposure control, adjustment of the mA or kV according to patient size, iterative reconstruction. FINDINGS: - Liver: Within normal limits. - Gallbladder and bile ducts: Postcholecystectomy, with no biliary tract dilatation. - Spleen: The spleen is mildly enlarged, measuring 13.5 cm in craniocaudal length, but otherwise normal in appearance. - Urinary tract: Within normal limits. - Adrenals: Within normal limits. - Pancreas: Within normal limits.  - Gastrointestinal tract: Within normal limits. The appendix is not visualized. - Retroperitoneum: Within normal limits. - Peritoneal cavity and abdominal wall: Within normal limits. - Pelvis: Within normal limits. - Spine/bones: No acute process. - Other comments: There is minimal atelectasis or infiltrate in the medial right lower lobe. 1. Mild splenomegaly. 2. Prior cholecystectomy. 3. Minimal atelectasis or pneumonia in the right lung base.        Current Meds:  Current Facility-Administered Medications   Medication Dose Route Frequency    diphenhydrAMINE (BENADRYL) capsule 25 mg  25 mg Oral Q6H PRN    phenol throat spray (CHLORASEPTIC) 1 Spray  1 Spray Oral PRN    ketorolac (TORADOL) injection 15 mg  15 mg IntraVENous Q6H PRN    morphine injection 2 mg  2 mg IntraVENous Q2H PRN    sodium chloride (NS) flush 5-10 mL  5-10 mL IntraVENous Q8H    sodium chloride (NS) flush 5-10 mL  5-10 mL IntraVENous PRN    sodium chloride (NS) flush 5-40 mL  5-40 mL IntraVENous Q8H    sodium chloride (NS) flush 5-40 mL  5-40 mL IntraVENous PRN    acetaminophen (TYLENOL) tablet 650 mg  650 mg Oral Q6H PRN    Or    acetaminophen (TYLENOL) suppository 650 mg  650 mg Rectal Q6H PRN    polyethylene glycol (MIRALAX) packet 17 g  17 g Oral DAILY PRN    ondansetron (ZOFRAN ODT) tablet 4 mg  4 mg Oral Q8H PRN    Or    ondansetron (ZOFRAN) injection 4 mg  4 mg IntraVENous Q6H PRN    0.9% sodium chloride infusion  125 mL/hr IntraVENous CONTINUOUS    HYDROcodone-acetaminophen (NORCO) 5-325 mg per tablet 1 Tablet  1 Tablet Oral Q6H PRN       Signed:  Sae David MD

## 2022-03-13 NOTE — PROGRESS NOTES
Pt in severe pain medicated with IV morphine, no relief. Medicated with IV toradol and Norco. Pt states \"it is painful to swallow\" and grimaces when swallowing. States he is unable to eat because \"it hurts too bad\". Pain is in his throat, head, and ears. Messaged hospitalistTay, IV morphine 2mg ordered.

## 2022-03-13 NOTE — H&P
Hospitalist History and Physical   Admit Date:  3/12/2022  5:13 PM   Name:  Merline Coin   Age:  27 y.o. Sex:  male  :  1995   MRN:  [id number]     Presenting Complaint: nausea/vomiting  Reason(s) for Admission: Intractable nausea and vomiting [R11.2]  Mononucleosis [B27.90]     History of Present Illness:   Merline Coin is a 27 y.o. male with medical history of recent diagnosis of mononucleosis who presented to ED with intractable nausea/vomiting. Patient was diagnosed with mono last Saturday. For the past 24 hours, he has not been able to keep any food/liquid down. He reports myalgias and subjective fevers. Upon ER workup, WBC is WNL. LFTs are somewhat elevated. CT shows expected mild splenomegaly. GI was consulted by ER, who advise this is not out of the realm of expected for mono and only would recommend General Surgery follow up if splenomegaly worsened. Given his persistent N/V, Hospitalist asked to admit. Will observe overnight for pain and nausea treatment. Review of Systems:  10 systems reviewed and negative except as noted in HPI. Assessment & Plan:   * Intractable nausea and vomiting  - Due to infective mono  - IVFs  - Zofran prn  - Full liquid diet, advance as tolerated    Mononucleosis  - Supportive care  - Advised against any contact sports/activities  - CT with mild splenomegaly as expected    Elevated LFTs  - Some elevation in LFTs  - GI consulted by ER, no interventions needed at this time  - Recheck CMP in AM       Disposition: obs    Diet: ADULT DIET Regular  VTE ppx: scds  Code status: Full Code      Past medical history reviewed. Past surgical history reviewed.      No Known Allergies   Social History     Tobacco Use    Smoking status: Not on file    Smokeless tobacco: Not on file   Substance Use Topics    Alcohol use: Not on file         Family history reviewed and noncontributory to patient's acute condition; no relevant family history unless otherwise noted above.    There is no immunization history on file for this patient. PTA Medications:  No current outpatient medications    Objective:     Patient Vitals for the past 24 hrs:   Temp Pulse Resp BP SpO2   03/12/22 2100 -- 87 -- -- 96 %   03/12/22 2059 -- -- -- 131/65 --   03/12/22 1712 (!) 101 °F (38.3 °C) 100 20 109/71 95 %     Oxygen Therapy  O2 Sat (%): 96 % (03/12/22 2100)  Pulse via Oximetry: 87 beats per minute (03/12/22 2100)  O2 Device: None (Room air) (03/12/22 1712)    Estimated body mass index is 19.8 kg/m² as calculated from the following:    Height as of this encounter: 5' 10\" (1.778 m). Weight as of this encounter: 62.6 kg (138 lb). No intake or output data in the 24 hours ending 03/12/22 2149      Physical Exam:  General:    Well nourished. No overt distress  Head:  Normocephalic, atraumatic  Eyes:  Sclerae appear normal.  Pupils equally round. HENT:  Nares appear normal, no drainage. Moist mucous membranes  Neck:  No restricted ROM. Trachea midline  CV:   RRR. S1/S2 auscultated  Lungs:   CTAB. No wheezing, rhonchi, or rales. Appears even, unlabored  Abdomen: Bowel sounds present. Soft, nondistended. Mildly TTP throughout, but worse in LUQ/flank  Extremities: Warm and dry. No cyanosis or clubbing. No edema. Skin:     No rashes. Normal turgor. Normal coloration  Neuro:  Cranial nerves II-XII grossly intact. Sensation intact  Psych:  Normal mood and affect.   Alert and oriented x3    Data Ordered and Personally Reviewed:    Last 24hr Labs:  Recent Results (from the past 24 hour(s))   CBC WITH AUTOMATED DIFF    Collection Time: 03/12/22  5:21 PM   Result Value Ref Range    WBC 10.8 4.3 - 11.1 K/uL    RBC 5.20 4.23 - 5.6 M/uL    HGB 15.8 13.6 - 17.2 g/dL    HCT 46.0 41.1 - 50.3 %    MCV 88.5 79.6 - 97.8 FL    MCH 30.4 26.1 - 32.9 PG    MCHC 34.3 31.4 - 35.0 g/dL    RDW 11.3 (L) 11.9 - 14.6 %    PLATELET 126 351 - 593 K/uL    MPV 9.1 (L) 9.4 - 12.3 FL    ABSOLUTE NRBC 0.00 0.0 - 0.2 K/uL    NEUTROPHILS 27 (L) 43 - 78 %    LYMPHOCYTES 59 (H) 13 - 44 %    MONOCYTES 10 4.0 - 12.0 %    EOSINOPHILS 0 (L) 0.5 - 7.8 %    BASOPHILS 3 (H) 0.0 - 2.0 %    IMMATURE GRANULOCYTES 1 0.0 - 5.0 %    ABS. NEUTROPHILS 2.9 1.7 - 8.2 K/UL    ABS. LYMPHOCYTES 6.4 (H) 0.5 - 4.6 K/UL    ABS. MONOCYTES 1.1 0.1 - 1.3 K/UL    ABS. EOSINOPHILS 0.0 0.0 - 0.8 K/UL    ABS. BASOPHILS 0.3 (H) 0.0 - 0.2 K/UL    ABS. IMM. GRANS. 0.1 0.0 - 0.5 K/UL    RBC COMMENTS NORMOCYTIC/NORMOCHROMIC      WBC COMMENTS Result Confirmed By Smear      PLATELET COMMENTS ADEQUATE      DF MANUAL     METABOLIC PANEL, COMPREHENSIVE    Collection Time: 03/12/22  5:21 PM   Result Value Ref Range    Sodium 135 (L) 136 - 145 mmol/L    Potassium 4.0 3.5 - 5.1 mmol/L    Chloride 99 98 - 107 mmol/L    CO2 29 21 - 32 mmol/L    Anion gap 7 7 - 16 mmol/L    Glucose 110 (H) 65 - 100 mg/dL    BUN 7 6 - 23 MG/DL    Creatinine 0.98 0.8 - 1.5 MG/DL    GFR est AA >60 >60 ml/min/1.73m2    GFR est non-AA >60 >60 ml/min/1.73m2    Calcium 9.3 8.3 - 10.4 MG/DL    Bilirubin, total 0.8 0.2 - 1.1 MG/DL    ALT (SGPT) 665 (H) 12 - 65 U/L    AST (SGOT) 341 (H) 15 - 37 U/L    Alk. phosphatase 336 (H) 50 - 136 U/L    Protein, total 7.6 6.3 - 8.2 g/dL    Albumin 3.5 3.5 - 5.0 g/dL    Globulin 4.1 (H) 2.3 - 3.5 g/dL    A-G Ratio 0.9 (L) 1.2 - 3.5     LIPASE    Collection Time: 03/12/22  5:21 PM   Result Value Ref Range    Lipase 85 73 - 393 U/L   ACETAMINOPHEN    Collection Time: 03/12/22  5:21 PM   Result Value Ref Range    Acetaminophen level <10 (L) 10.0 - 30 ug/mL   URINE MICROSCOPIC    Collection Time: 03/12/22  5:32 PM   Result Value Ref Range    WBC 0-3 0 /hpf    RBC 5-10 0 /hpf    Epithelial cells 0 0 /hpf    Bacteria 0 0 /hpf    Casts 0-3 0 /lpf       All Micro Results     None          Other Studies:  XR CHEST PA LAT    Result Date: 3/12/2022  EXAM: Chest x-ray. INDICATION: Dyspnea. COMPARISON: None. TECHNIQUE: Frontal and lateral view chest x-ray.  FINDINGS: The lungs are clear. The cardiac size, mediastinal contour and pulmonary vasculature are normal. No pneumothorax or pleural effusion is seen. The bony structures are within normal limits. Normal chest x-ray. CT ABD PELV W CONT    Result Date: 3/12/2022  EXAM: CT abdomen and pelvis with IV contrast. INDICATION: Abdominal pain. COMPARISON: None. TECHNIQUE: Axial CT images of the abdomen and pelvis were obtained after the intravenous injection of 100 mL Isovue 370 CT contrast. Oral contrast was administered as well. Radiation dose reduction techniques were used for this study. Our CT scanners use one or all of the following:  Automated exposure control, adjustment of the mA or kV according to patient size, iterative reconstruction. FINDINGS: - Liver: Within normal limits. - Gallbladder and bile ducts: Postcholecystectomy, with no biliary tract dilatation. - Spleen: The spleen is mildly enlarged, measuring 13.5 cm in craniocaudal length, but otherwise normal in appearance. - Urinary tract: Within normal limits. - Adrenals: Within normal limits. - Pancreas: Within normal limits. - Gastrointestinal tract: Within normal limits. The appendix is not visualized. - Retroperitoneum: Within normal limits. - Peritoneal cavity and abdominal wall: Within normal limits. - Pelvis: Within normal limits. - Spine/bones: No acute process. - Other comments: There is minimal atelectasis or infiltrate in the medial right lower lobe. 1. Mild splenomegaly. 2. Prior cholecystectomy. 3. Minimal atelectasis or pneumonia in the right lung base.          Medications Administered     diatrizoate linda-diatrizoat sod (MD-GASTROVIEW,GASTROGRAFIN) 66-10 % contrast solution 15 mL     Admin Date  03/12/2022 Action  Given Dose  15 mL Route  Oral Administered By  Sterling Perez RN          iopamidoL (ISOVUE-370) 76 % injection 100 mL     Admin Date  03/12/2022 Action  Given Dose  100 mL Route  IntraVENous Administered By  RT Saroj, R ketorolac (TORADOL) injection 15 mg     Admin Date  03/12/2022 Action  Given Dose  15 mg Route  IntraVENous Administered By  Lawrence Bailey RN          ondansetron [facility]) injection 4 mg     Admin Date  03/12/2022 Action  Given Dose  4 mg Route  IntraVENous Administered By  Lawrence Bailey RN          saline peripheral flush soln 10 mL     Admin Date  03/12/2022 Action  Given Dose  10 mL Route  InterCATHeter Administered By  Steve NEUMANN RT, R          sodium chloride 0.9 % bolus infusion 1,000 mL     Admin Date  03/12/2022 Action  New Bag Dose  1,000 mL Rate  1,000 mL/hr Route  IntraVENous Administered By  Lawrence Bailey RN           Admin Date  03/12/2022 Action  New Bag Dose  1,000 mL Rate  1,000 mL/hr Route  IntraVENous Administered By  Lawrence Bailey RN          sodium chloride 0.9 % bolus infusion 100 mL     Admin Date  03/12/2022 Action  New Bag Dose  100 mL Rate   Route  IntraVENous Administered By  Linn Del Angel RT, R                  Signed:  Kim Madrid MD

## 2022-03-13 NOTE — PROGRESS NOTES
Care Management Interventions  PCP Verified by CM: Yes (Patient declined referral to [facility])  Support Systems: Spouse/Significant Other  Discharge Location  Patient Expects to be Discharged to[de-identified] Home with family assistance  27 yr-old male with mononucleosis. [employer]. Chart screened by  for discharge planning. No needs identified at this time. Please consult  if any new issues arise.

## 2022-03-13 NOTE — ASSESSMENT & PLAN NOTE
Admission , , ALKP 336; GI consulted by ER, no interventions needed at this time  - Recheck CMP in AM  - autoimmune, hepatitis labs    3/14: improving, additional labs pending

## 2022-03-13 NOTE — PROGRESS NOTES
Medicated with Toradol 15 mg IV for c/o pain and given chloraseptic spray and magic mouthwash for c/o sore throat, see MAR.

## 2022-03-13 NOTE — ASSESSMENT & PLAN NOTE
- Supportive care  - Advised against any contact sports/activities  - CT with mild splenomegaly as expected

## 2022-03-14 ENCOUNTER — APPOINTMENT (OUTPATIENT)
Dept: GENERAL RADIOLOGY | Age: 27
DRG: 866 | End: 2022-03-14
Attending: FAMILY MEDICINE
Payer: COMMERCIAL

## 2022-03-14 LAB
CRP SERPL-MCNC: 4.7 MG/DL (ref 0–0.9)
ERYTHROCYTE [SEDIMENTATION RATE] IN BLOOD: 15 MM/HR (ref 0–20)
GGT SERPL-CCNC: 689 U/L (ref 15–85)

## 2022-03-14 PROCEDURE — 2709999900 HC NON-CHARGEABLE SUPPLY

## 2022-03-14 PROCEDURE — 74011250636 HC RX REV CODE- 250/636: Performed by: FAMILY MEDICINE

## 2022-03-14 PROCEDURE — 71046 X-RAY EXAM CHEST 2 VIEWS: CPT

## 2022-03-14 PROCEDURE — 84080 ASSAY ALKALINE PHOSPHATASES: CPT

## 2022-03-14 PROCEDURE — 74011000250 HC RX REV CODE- 250: Performed by: EMERGENCY MEDICINE

## 2022-03-14 PROCEDURE — 96376 TX/PRO/DX INJ SAME DRUG ADON: CPT

## 2022-03-14 PROCEDURE — 85652 RBC SED RATE AUTOMATED: CPT

## 2022-03-14 PROCEDURE — 74011250637 HC RX REV CODE- 250/637: Performed by: FAMILY MEDICINE

## 2022-03-14 PROCEDURE — 86140 C-REACTIVE PROTEIN: CPT

## 2022-03-14 PROCEDURE — 36415 COLL VENOUS BLD VENIPUNCTURE: CPT

## 2022-03-14 PROCEDURE — 74011000250 HC RX REV CODE- 250: Performed by: FAMILY MEDICINE

## 2022-03-14 PROCEDURE — G0378 HOSPITAL OBSERVATION PER HR: HCPCS

## 2022-03-14 RX ADMIN — MORPHINE SULFATE 2 MG: 2 INJECTION, SOLUTION INTRAMUSCULAR; INTRAVENOUS at 21:31

## 2022-03-14 RX ADMIN — KETOROLAC TROMETHAMINE 15 MG: 15 INJECTION, SOLUTION INTRAMUSCULAR; INTRAVENOUS at 10:21

## 2022-03-14 RX ADMIN — SODIUM CHLORIDE, SODIUM LACTATE, POTASSIUM CHLORIDE, AND CALCIUM CHLORIDE 125 ML/HR: 600; 310; 30; 20 INJECTION, SOLUTION INTRAVENOUS at 07:22

## 2022-03-14 RX ADMIN — ONDANSETRON 4 MG: 2 INJECTION INTRAMUSCULAR; INTRAVENOUS at 21:30

## 2022-03-14 RX ADMIN — MORPHINE SULFATE 2 MG: 2 INJECTION, SOLUTION INTRAMUSCULAR; INTRAVENOUS at 12:26

## 2022-03-14 RX ADMIN — KETOROLAC TROMETHAMINE 15 MG: 15 INJECTION, SOLUTION INTRAMUSCULAR; INTRAVENOUS at 04:19

## 2022-03-14 RX ADMIN — MORPHINE SULFATE 2 MG: 2 INJECTION, SOLUTION INTRAMUSCULAR; INTRAVENOUS at 04:15

## 2022-03-14 RX ADMIN — KETOROLAC TROMETHAMINE 15 MG: 15 INJECTION, SOLUTION INTRAMUSCULAR; INTRAVENOUS at 21:30

## 2022-03-14 RX ADMIN — MORPHINE SULFATE 2 MG: 2 INJECTION, SOLUTION INTRAMUSCULAR; INTRAVENOUS at 07:52

## 2022-03-14 RX ADMIN — ONDANSETRON 4 MG: 2 INJECTION INTRAMUSCULAR; INTRAVENOUS at 10:41

## 2022-03-14 RX ADMIN — MORPHINE SULFATE 2 MG: 2 INJECTION, SOLUTION INTRAMUSCULAR; INTRAVENOUS at 15:25

## 2022-03-14 RX ADMIN — HYDROCODONE BITARTRATE AND ACETAMINOPHEN 1 TABLET: 5; 325 TABLET ORAL at 10:22

## 2022-03-14 RX ADMIN — HYDROCODONE BITARTRATE AND ACETAMINOPHEN 1 TABLET: 5; 325 TABLET ORAL at 01:55

## 2022-03-14 RX ADMIN — ACETAMINOPHEN 650 MG: 325 TABLET, FILM COATED ORAL at 10:24

## 2022-03-14 RX ADMIN — MORPHINE SULFATE 2 MG: 2 INJECTION, SOLUTION INTRAMUSCULAR; INTRAVENOUS at 18:21

## 2022-03-14 RX ADMIN — SODIUM CHLORIDE, SODIUM LACTATE, POTASSIUM CHLORIDE, AND CALCIUM CHLORIDE 125 ML/HR: 600; 310; 30; 20 INJECTION, SOLUTION INTRAVENOUS at 15:29

## 2022-03-14 RX ADMIN — KETOROLAC TROMETHAMINE 15 MG: 15 INJECTION, SOLUTION INTRAMUSCULAR; INTRAVENOUS at 16:46

## 2022-03-14 RX ADMIN — SODIUM CHLORIDE, PRESERVATIVE FREE 10 ML: 5 INJECTION INTRAVENOUS at 18:25

## 2022-03-14 RX ADMIN — ONDANSETRON 4 MG: 2 INJECTION INTRAMUSCULAR; INTRAVENOUS at 04:19

## 2022-03-14 RX ADMIN — SODIUM CHLORIDE, PRESERVATIVE FREE 10 ML: 5 INJECTION INTRAVENOUS at 18:26

## 2022-03-14 RX ADMIN — MORPHINE SULFATE 2 MG: 2 INJECTION, SOLUTION INTRAMUSCULAR; INTRAVENOUS at 01:49

## 2022-03-14 NOTE — PROGRESS NOTES
Patient has oral temp of 100.8. Patient clarifies care team wanted him to avoid taking tylenol. Dr. Nader Tobar regarding temperature.

## 2022-03-14 NOTE — PROGRESS NOTES
Administered PRN Norco 5-325 mg PO and Toradol 15 mg IVP per MD orders for throat pain. 6/10. After administration patient then requested his PRN morphine. I explained to the patient that I just administered 2 PRN pain medications which one was a Narcotic pain medication (Norco). I explained to the patient that he would have to wait at least 2 hours for another narcotic pain medication. I also explained to the patient that cold drinks, and ice cream could help with the soreness of his throat and for him to rest. Patient verbalized understanding. Bed low and locked, side rails up x 3, call light within reach.

## 2022-03-14 NOTE — PROGRESS NOTES
Hospitalist Progress Note   Admit Date:  3/12/2022  5:13 PM   Name:  Geoff Blake   Age:  27 y.o. Sex:  male  :  1995   MRN:  [id number]   Room:  [facility]/    Presenting Complaint: Nausea    Reason(s) for Admission: Intractable nausea and vomiting [R11.2]  Mononucleosis [B27.90]     Hospital Course & Interval History:   26M PMHx recent diagnosis of mononucleosis who presented to ED with intractable nausea/vomiting. Patient was diagnosed with mono last Saturday. For the past 24 hours, he has not been able to keep any food/liquid down. He reports myalgias and subjective fevers. Upon ER workup, WBC is WNL. LFTs are somewhat elevated. CT shows expected mild splenomegaly. GI was consulted by ER, who advise this is not out of the realm of expected for mono and only would recommend General Surgery follow up if splenomegaly worsened. Given his persistent N/V, Hospitalist asked to admit. Will observe for pain and nausea treatment. Subjective/24hr Events (22): Difficulty swallowing. Nausea, vomiting, pain intermittently controlled throughout the day. Transaminase level declining. ROS:  10 systems reviewed and negative except as noted above.      Assessment & Plan:   * Intractable nausea and vomiting  Due to infective mono  - IVFs  - Zofran prn  - Full liquid diet, advance as tolerated    3/14: currently intermittently controlled, fevers, significant swelling of throat, difficulty swallowing medications    Mononucleosis  - Supportive care  - Advised against any contact sports/activities  - CT with mild splenomegaly as expected    Elevated transaminase level  Admission , , ALKP 336; GI consulted by ER, no interventions needed at this time  - Recheck CMP in AM  - autoimmune, hepatitis labs    3/14: improving, additional labs pending        Dispo/Discharge Planning:      Home in 1-2 days    Diet:  ADULT DIET Regular  DVT PPx: ambulatory  Code status: Full Code    Hospital Problems as of 3/14/2022 Date Reviewed: 3/13/2022          Codes Class Noted - Resolved POA    * (Principal) Intractable nausea and vomiting ICD-10-CM: R11.2  ICD-9-CM: 536.2  3/12/2022 - Present Yes        Mononucleosis ICD-10-CM: B27.90  ICD-9-CM: 236  3/12/2022 - Present Yes        Elevated transaminase level ICD-10-CM: R74.01  ICD-9-CM: 790.4  3/12/2022 - Present Yes              Objective:     Patient Vitals for the past 24 hrs:   Temp Pulse Resp BP SpO2   03/14/22 1122 98.3 °F (36.8 °C) 90 18 102/66 96 %   03/14/22 0901 (!) 102.3 °F (39.1 °C) -- -- -- --   03/14/22 0723 99.9 °F (37.7 °C) 92 18 118/70 96 %   03/14/22 0332 99.9 °F (37.7 °C) 95 17 (!) 105/56 96 %   03/13/22 2324 (!) 100.6 °F (38.1 °C) (!) 102 17 104/63 95 %   03/13/22 1919 (!) 100.8 °F (38.2 °C) 97 18 107/60 94 %   03/13/22 1522 98.9 °F (37.2 °C) 93 18 119/72 98 %     Oxygen Therapy  O2 Sat (%): 96 % (03/14/22 1122)  Pulse via Oximetry: 87 beats per minute (03/12/22 2100)  O2 Device: None (Room air) (03/13/22 1140)    Estimated body mass index is 19.8 kg/m² as calculated from the following:    Height as of this encounter: 5' 10\" (1.778 m). Weight as of this encounter: 62.6 kg (138 lb). No intake or output data in the 24 hours ending 03/14/22 1212      Blood pressure 102/66, pulse 90, temperature 98.3 °F (36.8 °C), resp. rate 18, height 5' 10\" (1.778 m), weight 62.6 kg (138 lb), SpO2 96 %. Physical Exam  Vitals and nursing note reviewed. Constitutional:       General: He is not in acute distress. Appearance: Normal appearance. Comments: Affable   HENT:      Head: Normocephalic. Eyes:      Extraocular Movements: Extraocular movements intact. Cardiovascular:      Rate and Rhythm: Normal rate and regular rhythm. Pulses:           Radial pulses are 2+ on the left side. Pulmonary:      Effort: Pulmonary effort is normal. No respiratory distress. Musculoskeletal:         General: No deformity. Cervical back: No rigidity.    Skin: General: Skin is warm and dry. Neurological:      General: No focal deficit present. Mental Status: He is alert and oriented to person, place, and time. Psychiatric:         Mood and Affect: Mood normal.         Behavior: Behavior normal.         I have reviewed ordered lab tests and independently visualized imaging below:    Recent Labs:  Recent Results (from the past 48 hour(s))   CBC WITH AUTOMATED DIFF    Collection Time: 03/12/22  5:21 PM   Result Value Ref Range    WBC 10.8 4.3 - 11.1 K/uL    RBC 5.20 4.23 - 5.6 M/uL    HGB 15.8 13.6 - 17.2 g/dL    HCT 46.0 41.1 - 50.3 %    MCV 88.5 79.6 - 97.8 FL    MCH 30.4 26.1 - 32.9 PG    MCHC 34.3 31.4 - 35.0 g/dL    RDW 11.3 (L) 11.9 - 14.6 %    PLATELET 806 216 - 707 K/uL    MPV 9.1 (L) 9.4 - 12.3 FL    ABSOLUTE NRBC 0.00 0.0 - 0.2 K/uL    NEUTROPHILS 27 (L) 43 - 78 %    LYMPHOCYTES 59 (H) 13 - 44 %    MONOCYTES 10 4.0 - 12.0 %    EOSINOPHILS 0 (L) 0.5 - 7.8 %    BASOPHILS 3 (H) 0.0 - 2.0 %    IMMATURE GRANULOCYTES 1 0.0 - 5.0 %    ABS. NEUTROPHILS 2.9 1.7 - 8.2 K/UL    ABS. LYMPHOCYTES 6.4 (H) 0.5 - 4.6 K/UL    ABS. MONOCYTES 1.1 0.1 - 1.3 K/UL    ABS. EOSINOPHILS 0.0 0.0 - 0.8 K/UL    ABS. BASOPHILS 0.3 (H) 0.0 - 0.2 K/UL    ABS. IMM. GRANS. 0.1 0.0 - 0.5 K/UL    RBC COMMENTS NORMOCYTIC/NORMOCHROMIC      WBC COMMENTS Result Confirmed By Smear      PLATELET COMMENTS ADEQUATE      DF MANUAL     METABOLIC PANEL, COMPREHENSIVE    Collection Time: 03/12/22  5:21 PM   Result Value Ref Range    Sodium 135 (L) 136 - 145 mmol/L    Potassium 4.0 3.5 - 5.1 mmol/L    Chloride 99 98 - 107 mmol/L    CO2 29 21 - 32 mmol/L    Anion gap 7 7 - 16 mmol/L    Glucose 110 (H) 65 - 100 mg/dL    BUN 7 6 - 23 MG/DL    Creatinine 0.98 0.8 - 1.5 MG/DL    GFR est AA >60 >60 ml/min/1.73m2    GFR est non-AA >60 >60 ml/min/1.73m2    Calcium 9.3 8.3 - 10.4 MG/DL    Bilirubin, total 0.8 0.2 - 1.1 MG/DL    ALT (SGPT) 665 (H) 12 - 65 U/L    AST (SGOT) 341 (H) 15 - 37 U/L    Alk.  phosphatase 336 (H) 50 - 136 U/L    Protein, total 7.6 6.3 - 8.2 g/dL    Albumin 3.5 3.5 - 5.0 g/dL    Globulin 4.1 (H) 2.3 - 3.5 g/dL    A-G Ratio 0.9 (L) 1.2 - 3.5     LIPASE    Collection Time: 03/12/22  5:21 PM   Result Value Ref Range    Lipase 85 73 - 393 U/L   ACETAMINOPHEN    Collection Time: 03/12/22  5:21 PM   Result Value Ref Range    Acetaminophen level <10 (L) 10.0 - 30 ug/mL   URINE MICROSCOPIC    Collection Time: 03/12/22  5:32 PM   Result Value Ref Range    WBC 0-3 0 /hpf    RBC 5-10 0 /hpf    Epithelial cells 0 0 /hpf    Bacteria 0 0 /hpf    Casts 0-3 0 /lpf   METABOLIC PANEL, COMPREHENSIVE    Collection Time: 03/13/22  3:49 AM   Result Value Ref Range    Sodium 136 136 - 145 mmol/L    Potassium 3.9 3.5 - 5.1 mmol/L    Chloride 105 98 - 107 mmol/L    CO2 26 21 - 32 mmol/L    Anion gap 5 (L) 7 - 16 mmol/L    Glucose 97 65 - 100 mg/dL    BUN 7 6 - 23 MG/DL    Creatinine 0.83 0.8 - 1.5 MG/DL    GFR est AA >60 >60 ml/min/1.73m2    GFR est non-AA >60 >60 ml/min/1.73m2    Calcium 8.9 8.3 - 10.4 MG/DL    Bilirubin, total 0.5 0.2 - 1.1 MG/DL    ALT (SGPT) 531 (H) 12 - 65 U/L    AST (SGOT) 247 (H) 15 - 37 U/L    Alk. phosphatase 282 (H) 50 - 136 U/L    Protein, total 6.6 6.3 - 8.2 g/dL    Albumin 3.1 (L) 3.5 - 5.0 g/dL    Globulin 3.5 2.3 - 3.5 g/dL    A-G Ratio 0.9 (L) 1.2 - 3.5     CBC WITH AUTOMATED DIFF    Collection Time: 03/13/22  3:49 AM   Result Value Ref Range    WBC 9.5 4.3 - 11.1 K/uL    RBC 4.87 4.23 - 5.6 M/uL    HGB 14.8 13.6 - 17.2 g/dL    HCT 43.1 41.1 - 50.3 %    MCV 88.5 79.6 - 97.8 FL    MCH 30.4 26.1 - 32.9 PG    MCHC 34.3 31.4 - 35.0 g/dL    RDW 11.4 (L) 11.9 - 14.6 %    PLATELET 884 719 - 761 K/uL    MPV 9.2 (L) 9.4 - 12.3 FL    ABSOLUTE NRBC 0.00 0.0 - 0.2 K/uL    NEUTROPHILS 27 (L) 43 - 78 %    LYMPHOCYTES 53 (H) 13 - 44 %    MONOCYTES 18 (H) 4.0 - 12.0 %    EOSINOPHILS 0 (L) 0.5 - 7.8 %    BASOPHILS 2 0.0 - 2.0 %    IMMATURE GRANULOCYTES 0 0.0 - 5.0 %    ABS.  NEUTROPHILS 2.6 1.7 - 8.2 K/UL ABS. LYMPHOCYTES 5.0 (H) 0.5 - 4.6 K/UL    ABS. MONOCYTES 1.7 (H) 0.1 - 1.3 K/UL    ABS. EOSINOPHILS 0.0 0.0 - 0.8 K/UL    ABS. BASOPHILS 0.2 0.0 - 0.2 K/UL    ABS. IMM. GRANS. 0.0 0.0 - 0.5 K/UL    RBC COMMENTS NORMOCYTIC/NORMOCHROMIC      WBC COMMENTS Result Confirmed By Smear      PLATELET COMMENTS ADEQUATE      DF AUTOMATED     GGT    Collection Time: 03/13/22  2:44 PM   Result Value Ref Range     (H) 15 - 85 U/L   HEP B SURFACE AG    Collection Time: 03/13/22  2:44 PM   Result Value Ref Range    Hep B Surface Ag NONREACTIVE NR     HEPATITIS A AB, IGM    Collection Time: 03/13/22  2:44 PM   Result Value Ref Range    Hepatitis A, IgM NONREACTIVE NR     HIV 1/2 AG/AB, 4TH GENERATION,W RFLX CONFIRM    Collection Time: 03/13/22  2:44 PM   Result Value Ref Range    HIV 1/2 Interpretation NONREACTIVE NR      HIV 1/2 result comment SEE NOTE (A) NR         All Micro Results     None          Other Studies:  XR CHEST PA LAT    Result Date: 3/14/2022  EXAM: CHEST X-RAY, 2 VIEWS INDICATION: Right lung base atelectasis or pneumonia COMPARISON: Chest x-ray 3/12/2022 TECHNIQUE: Frontal and lateral views of the chest were obtained. FINDINGS: Lungs: Normal. Cardiomediastinal silhouette: Normal. Pleura: No pneumothorax or pleural effusion. Osseous structures: Normal.     No radiographic evidence of acute cardiopulmonary disease.       Current Meds:  Current Facility-Administered Medications   Medication Dose Route Frequency    diphenhydrAMINE (BENADRYL) capsule 25 mg  25 mg Oral Q6H PRN    phenol throat spray (CHLORASEPTIC) 1 Spray  1 Spray Oral PRN    ketorolac (TORADOL) injection 15 mg  15 mg IntraVENous Q6H PRN    morphine injection 2 mg  2 mg IntraVENous Q2H PRN    lactated Ringers infusion  125 mL/hr IntraVENous CONTINUOUS    ibuprofen (MOTRIN) tablet 600 mg  600 mg Oral Q6H PRN    sodium chloride (NS) flush 5-10 mL  5-10 mL IntraVENous Q8H    sodium chloride (NS) flush 5-10 mL  5-10 mL IntraVENous PRN    sodium chloride (NS) flush 5-40 mL  5-40 mL IntraVENous Q8H    sodium chloride (NS) flush 5-40 mL  5-40 mL IntraVENous PRN    acetaminophen (TYLENOL) tablet 650 mg  650 mg Oral Q6H PRN    Or    acetaminophen (TYLENOL) suppository 650 mg  650 mg Rectal Q6H PRN    polyethylene glycol (MIRALAX) packet 17 g  17 g Oral DAILY PRN    ondansetron (ZOFRAN ODT) tablet 4 mg  4 mg Oral Q8H PRN    Or    ondansetron (ZOFRAN) injection 4 mg  4 mg IntraVENous Q6H PRN    HYDROcodone-acetaminophen (NORCO) 5-325 mg per tablet 1 Tablet  1 Tablet Oral Q6H PRN       Signed:  Evy Key MD

## 2022-03-14 NOTE — PROGRESS NOTES
Problem: Falls - Risk of  Goal: *Absence of Falls  Description: Document Jensen Ottoniel Fall Risk and appropriate interventions in the flowsheet.   Outcome: Progressing Towards Goal  Note: Fall Risk Interventions:            Medication Interventions: Patient to call before getting OOB,Teach patient to arise slowly                   Problem: Patient Education: Go to Patient Education Activity  Goal: Patient/Family Education  Outcome: Progressing Towards Goal     Problem: Pain  Goal: *Control of Pain  Outcome: Progressing Towards Goal  Goal: *PALLIATIVE CARE:  Alleviation of Pain  Outcome: Progressing Towards Goal     Problem: Patient Education: Go to Patient Education Activity  Goal: Patient/Family Education  Outcome: Progressing Towards Goal

## 2022-03-14 NOTE — MED STUDENT NOTES
Progress Note    Patient: Sharmila Morales MRN: [id number]  SSN: [id number]    YOB: 1995  Age: 27 y.o. Sex: male      Admit Date: 3/12/2022    LOS: 0 days     Subjective:     Pt is a 26 y/o male w/ a recent Dx of infectious mononucleosis (10 days ago) who presented to ED with intractable nausea/vomiting. For the past 24 hours, has vomited all food/liquid.  He reports myalgias and subjective fevers. This morning, Pt states worsening of his muscle aches, fever, and nausea. He states his throat feels swollen. He states he has concluded is worst \"an hour before the Toradol. \" At the time of interview examination, Pt declined Bhakti Chahal from nursing staff, stating he would prefer to wait another hour to receive it with Toradol. SHx: Pt denied illicit drug use. He states binge drinking on weekends 1-2 times a month. He states he is not currently sexually active nor has he ever been. Objective:     Vitals:    03/13/22 2324 03/14/22 0332 03/14/22 0723 03/14/22 0901   BP: 104/63 (!) 105/56 118/70    Pulse: (!) 102 95 92    Resp: 17 17 18    Temp: (!) 100.6 °F (38.1 °C) 99.9 °F (37.7 °C) 99.9 °F (37.7 °C) (!) 102.3 °F (39.1 °C)   SpO2: 95% 96% 96%    Weight:       Height:            Intake and Output:  Current Shift: No intake/output data recorded. Pt denies any oral intake and denies any episodes of vomiting due to this. Last three shifts: No intake/output data recorded. Physical Exam:       Pt is mildly diaphoretic and warm to the touch. Oropharynx is erythematous and uvula+tonsils swollen. There is no visible exudate. Oral temperature recorded by this medical student w/ disposable thermometer at 102.3F. No TTP with subcostal palpitation of the liver/spleen. Pt still exhibits notable posterior cervical lymphadenopathy. Lab/Data Review:       WBC's are WNL. Lipase WNL.  LFTs are somewhat elevated but decreasing. ALP and GGT elevated at 282 and 689 respectively.   CT shows mild splenomegaly. [address] consultation advises his presentation is not outside the realm of expected for mono and only would recommend General Surgery follow up if splenomegaly worsened. Hospitalist admitted due to intractable N/V.     Results for Patricia García (MRN [id number]) as of 3/14/2022 09:01   Ref. Range 3/12/2022 17:21 3/13/2022 03:49 3/13/2022 14:44   ALT Latest Ref Range: 12 - 65 U/L 665 (H) 531 (H)    AST Latest Ref Range: 15 - 37 U/L 341 (H) 247 (H)    Alk. phosphatase Latest Ref Range: 50 - 136 U/L 336 (H) 282 (H)    Lipase Latest Ref Range: 73 - 393 U/L 85     GGT Latest Ref Range: 15 - 85 U/L   689 (H)     Hepatitis A IgM and HBsAg negative. Ref. Range 3/13/2022 14:44   Hepatitis A, IgM Latest Ref Range: NR   NONREACTIVE   Hep B Surface Ag Latest Ref Range: NR   NONREACTIVE   INFECTIOUS MONONUCLEOSIS, QL Unknown Rpt         Assessment  & Plan:     Continue Rosalio Portillo MD's plan as copied below. Updates/alterations bolded and underlined.     * Intractable nausea and vomiting  Due to infective mono  - IVFs  - Zofran prn  - Full liquid diet, advance as tolerated     3/13: currently intermittently controlled     Mononucleosis  - Supportive care  - Advised against any contact sports/activities  - CT with mild splenomegaly as expected     Elevated transaminase level  Admission , , ALKP 336; GI consulted by ER, no interventions needed at this time  - Recheck CMP in AM  - autoimmune, hepatitis labs   Hepatitis A IgM and HBsAg negative.     3/13: improving, additional labs          Dispo/Discharge Planning:      Home in 1-2 days     Diet:  ADULT DIET Regular  DVT PPx: ambulatory  Code status: Full Code      Signed By: Clayton Israel     March 14, 2022

## 2022-03-15 ENCOUNTER — APPOINTMENT (OUTPATIENT)
Dept: ULTRASOUND IMAGING | Age: 27
DRG: 866 | End: 2022-03-15
Attending: FAMILY MEDICINE
Payer: COMMERCIAL

## 2022-03-15 VITALS
DIASTOLIC BLOOD PRESSURE: 69 MMHG | SYSTOLIC BLOOD PRESSURE: 105 MMHG | BODY MASS INDEX: 19.76 KG/M2 | TEMPERATURE: 99.1 F | RESPIRATION RATE: 18 BRPM | HEIGHT: 70 IN | OXYGEN SATURATION: 95 % | HEART RATE: 79 BPM | WEIGHT: 138 LBS

## 2022-03-15 PROBLEM — R11.2 INTRACTABLE NAUSEA AND VOMITING: Status: RESOLVED | Noted: 2022-03-12 | Resolved: 2022-03-15

## 2022-03-15 PROBLEM — R13.10 DIFFICULTY SWALLOWING: Status: ACTIVE | Noted: 2022-03-15

## 2022-03-15 LAB
ALBUMIN SERPL-MCNC: 2.9 G/DL (ref 3.5–5)
ALBUMIN/GLOB SERPL: 0.8 {RATIO} (ref 1.2–3.5)
ALP SERPL-CCNC: 305 U/L (ref 50–136)
ALT SERPL-CCNC: 349 U/L (ref 12–65)
ANA SER QL: NEGATIVE
ANION GAP SERPL CALC-SCNC: 5 MMOL/L (ref 7–16)
AST SERPL-CCNC: 125 U/L (ref 15–37)
BASOPHILS # BLD: 0.2 K/UL (ref 0–0.2)
BASOPHILS NFR BLD: 2 % (ref 0–2)
BILIRUB SERPL-MCNC: 0.5 MG/DL (ref 0.2–1.1)
BUN SERPL-MCNC: 7 MG/DL (ref 6–23)
CALCIUM SERPL-MCNC: 9.3 MG/DL (ref 8.3–10.4)
CHLORIDE SERPL-SCNC: 104 MMOL/L (ref 98–107)
CO2 SERPL-SCNC: 29 MMOL/L (ref 21–32)
CREAT SERPL-MCNC: 0.76 MG/DL (ref 0.8–1.5)
DIFFERENTIAL METHOD BLD: ABNORMAL
EOSINOPHIL # BLD: 0 K/UL (ref 0–0.8)
EOSINOPHIL NFR BLD: 0 % (ref 0.5–7.8)
ERYTHROCYTE [DISTWIDTH] IN BLOOD BY AUTOMATED COUNT: 11.5 % (ref 11.9–14.6)
GLOBULIN SER CALC-MCNC: 3.8 G/DL (ref 2.3–3.5)
GLUCOSE SERPL-MCNC: 96 MG/DL (ref 65–100)
HCT VFR BLD AUTO: 41.6 % (ref 41.1–50.3)
HGB BLD-MCNC: 14.2 G/DL (ref 13.6–17.2)
IGG4 SER-MCNC: 80 MG/DL (ref 2–96)
IMM GRANULOCYTES # BLD AUTO: 0 K/UL (ref 0–0.5)
IMM GRANULOCYTES NFR BLD AUTO: 0 % (ref 0–5)
LYMPHOCYTES # BLD: 4.4 K/UL (ref 0.5–4.6)
LYMPHOCYTES NFR BLD: 56 % (ref 13–44)
MAGNESIUM SERPL-MCNC: 2.3 MG/DL (ref 1.8–2.4)
MCH RBC QN AUTO: 30.3 PG (ref 26.1–32.9)
MCHC RBC AUTO-ENTMCNC: 34.1 G/DL (ref 31.4–35)
MCV RBC AUTO: 88.7 FL (ref 79.6–97.8)
MITOCHONDRIA M2 IGG SER-ACNC: <20 UNITS (ref 0–20)
MONOCYTES # BLD: 0.8 K/UL (ref 0.1–1.3)
MONOCYTES NFR BLD: 10 % (ref 4–12)
NEUTS SEG # BLD: 2.5 K/UL (ref 1.7–8.2)
NEUTS SEG NFR BLD: 32 % (ref 43–78)
NRBC # BLD: 0 K/UL (ref 0–0.2)
PHOSPHATE SERPL-MCNC: 3.9 MG/DL (ref 2.5–4.5)
PLATELET # BLD AUTO: 306 K/UL (ref 150–450)
PLATELET COMMENTS,PCOM: ADEQUATE
PMV BLD AUTO: 8.8 FL (ref 9.4–12.3)
POTASSIUM SERPL-SCNC: 4.3 MMOL/L (ref 3.5–5.1)
PROT SERPL-MCNC: 6.7 G/DL (ref 6.3–8.2)
RBC # BLD AUTO: 4.69 M/UL (ref 4.23–5.6)
RBC MORPH BLD: ABNORMAL
RBC MORPH BLD: ABNORMAL
SMA IGG SER-ACNC: 5 UNITS (ref 0–19)
SODIUM SERPL-SCNC: 138 MMOL/L (ref 136–145)
WBC # BLD AUTO: 7.9 K/UL (ref 4.3–11.1)
WBC MORPH BLD: ABNORMAL

## 2022-03-15 PROCEDURE — 85025 COMPLETE CBC W/AUTO DIFF WBC: CPT

## 2022-03-15 PROCEDURE — G0378 HOSPITAL OBSERVATION PER HR: HCPCS

## 2022-03-15 PROCEDURE — 96376 TX/PRO/DX INJ SAME DRUG ADON: CPT

## 2022-03-15 PROCEDURE — 74011250637 HC RX REV CODE- 250/637: Performed by: FAMILY MEDICINE

## 2022-03-15 PROCEDURE — 80053 COMPREHEN METABOLIC PANEL: CPT

## 2022-03-15 PROCEDURE — 83735 ASSAY OF MAGNESIUM: CPT

## 2022-03-15 PROCEDURE — 76705 ECHO EXAM OF ABDOMEN: CPT

## 2022-03-15 PROCEDURE — 92610 EVALUATE SWALLOWING FUNCTION: CPT

## 2022-03-15 PROCEDURE — 65270000029 HC RM PRIVATE

## 2022-03-15 PROCEDURE — 74011250636 HC RX REV CODE- 250/636: Performed by: FAMILY MEDICINE

## 2022-03-15 PROCEDURE — 2709999900 HC NON-CHARGEABLE SUPPLY

## 2022-03-15 PROCEDURE — 84100 ASSAY OF PHOSPHORUS: CPT

## 2022-03-15 RX ORDER — KETOROLAC TROMETHAMINE 10 MG/1
10 TABLET, FILM COATED ORAL
Status: DISCONTINUED | OUTPATIENT
Start: 2022-03-15 | End: 2022-03-15 | Stop reason: HOSPADM

## 2022-03-15 RX ORDER — ONDANSETRON 4 MG/1
4 TABLET, ORALLY DISINTEGRATING ORAL
Qty: 24 TABLET | Refills: 0 | Status: SHIPPED | OUTPATIENT
Start: 2022-03-15

## 2022-03-15 RX ORDER — KETOROLAC TROMETHAMINE 10 MG/1
10 TABLET, FILM COATED ORAL
Qty: 20 TABLET | Refills: 0 | Status: SHIPPED | OUTPATIENT
Start: 2022-03-15 | End: 2022-03-20

## 2022-03-15 RX ORDER — HYDROCODONE BITARTRATE AND ACETAMINOPHEN 5; 325 MG/1; MG/1
1 TABLET ORAL
Qty: 12 TABLET | Refills: 0 | Status: SHIPPED | OUTPATIENT
Start: 2022-03-15 | End: 2022-03-18

## 2022-03-15 RX ADMIN — KETOROLAC TROMETHAMINE 10 MG: 10 TABLET, FILM COATED ORAL at 12:12

## 2022-03-15 RX ADMIN — MORPHINE SULFATE 2 MG: 2 INJECTION, SOLUTION INTRAMUSCULAR; INTRAVENOUS at 00:12

## 2022-03-15 RX ADMIN — SODIUM CHLORIDE, SODIUM LACTATE, POTASSIUM CHLORIDE, AND CALCIUM CHLORIDE 125 ML/HR: 600; 310; 30; 20 INJECTION, SOLUTION INTRAVENOUS at 00:17

## 2022-03-15 RX ADMIN — KETOROLAC TROMETHAMINE 15 MG: 15 INJECTION, SOLUTION INTRAMUSCULAR; INTRAVENOUS at 02:58

## 2022-03-15 RX ADMIN — ONDANSETRON 4 MG: 2 INJECTION INTRAMUSCULAR; INTRAVENOUS at 02:58

## 2022-03-15 RX ADMIN — ACETAMINOPHEN 650 MG: 325 TABLET, FILM COATED ORAL at 03:04

## 2022-03-15 RX ADMIN — MORPHINE SULFATE 2 MG: 2 INJECTION, SOLUTION INTRAMUSCULAR; INTRAVENOUS at 02:58

## 2022-03-15 RX ADMIN — HYDROCODONE BITARTRATE AND ACETAMINOPHEN 1 TABLET: 5; 325 TABLET ORAL at 14:38

## 2022-03-15 RX ADMIN — HYDROCODONE BITARTRATE AND ACETAMINOPHEN 1 TABLET: 5; 325 TABLET ORAL at 08:52

## 2022-03-15 NOTE — PROGRESS NOTES
Care Management Interventions  PCP Verified by CM: Yes (Patient declined referral to [facility])  Mode of Transport at Discharge: Self  Support Systems: Spouse/Significant Other  Discharge Location  Patient Expects to be Discharged to[de-identified] Home with family assistance    Patient discharged today to home. I met with pt and explained need for PCP and f/u care. He verbalized importance and requested help obtaining a PCP and did not have a preference towards provider. Referral sent to our physician services requesting help w/ PCP [facility] referral). They will f/u with pt at home-pt aware. Pt denied any dc planning needs.

## 2022-03-15 NOTE — PROGRESS NOTES
Shift assessment completed via flow sheet. Pt a/o x 4, bed was soaked this morning from fever breaking over night. Patient reports \"feeling slightly better today\". Throat is still mildly swollen and patient still reports difficulty swallowing. Bed low and locked, side rails up x 3, call light within reach. Instructed patient to call for assistance, patient verbalized understanding.

## 2022-03-15 NOTE — PROGRESS NOTES
Problem: Falls - Risk of  Goal: *Absence of Falls  Description: Document Albert Caseyer Fall Risk and appropriate interventions in the flowsheet.   Outcome: Resolved/Met  Note: Fall Risk Interventions:            Medication Interventions: Patient to call before getting OOB,Teach patient to arise slowly                   Problem: Patient Education: Go to Patient Education Activity  Goal: Patient/Family Education  Outcome: Resolved/Met     Problem: Pain  Goal: *Control of Pain  Outcome: Resolved/Met  Goal: *PALLIATIVE CARE:  Alleviation of Pain  Outcome: Resolved/Met     Problem: Patient Education: Go to Patient Education Activity  Goal: Patient/Family Education  Outcome: Resolved/Met

## 2022-03-15 NOTE — PROGRESS NOTES
SPEECH LANGUAGE PATHOLOGY: DYSPHAGIA  Initial Assessment and Discharge    NAME/AGE/GENDER: Real Suárez is a 27 y.o. male  DATE: 3/15/2022  PRIMARY DIAGNOSIS: Intractable nausea and vomiting [R11.2]  Mononucleosis [B27.90]  Difficulty swallowing [R13.10]      ICD-10: Treatment Diagnosis: R13.13 Dysphagia, Pharyngeal Phase    RECOMMENDATIONS   DIET: advance as tolerated    full liquids   Thin Liquids    MEDICATIONS: liquid if able     ASPIRATION PRECAUTIONS  · Slow rate of intake  · Small bites/sips  · Upright at 90 degrees during meal     COMPENSATORY STRATEGIES/MODIFICATIONS  · Small sips and bites     EDUCATION:Recommendations discussed with Patient     CONTINUATION OF SKILLED SERVICES/MEDICAL NECESSITY:   No further skilled therapy indicated. RECOMMENDATIONS for CONTINUED SPEECH THERAPY: No further speech therapy indicated at this time. ASSESSMENT   Patient presents with odynophagia, but no s/sx aspiration. Sore, swollen, irritated throat. Uvula and tonsils appear red, inflamed. Limited assessment due to discomfort. Patient reports spitting out saliva some due to discomfort swallowing. Reports after meds, able to tolerate some cold liquids better, but otherwise has been unable to trial any solids due to the discomfort. Recommend full liquids/thin liquids. Advance as tolerated as management of pain/swelling improves. He is fully alert, oriented and has insight into what he can and cannot tolerate from discomfort standpoint. REHABILITATION POTENTIAL FOR STATED GOALS: Excellent    PLAN    FREQUENCY/DURATION: no skilled therapy warranted. SUBJECTIVE   Alert,very pleasant. Reports throat irritated, sore, and just hurts so not able to really eat/drink. History of Present Injury/Illness: Mr. Triny Sauceda  has no past medical history on file. Felisha Cornejo He also  has no past surgical history on file. Problem List:  (Impairments causing functional limitations):  1.  MONO    Previous Dysphagia: since having mono. N/V and poor intake due to painful swallowing   Emesis bag at bedside. Reports spitting out secretions some due to discomfort swallowing. Diet Prior to Evaluation: regular/thin    Orientation:   Person  Place  Time  Situation    Pain: Pain Scale 1: Numeric (0 - 10)  Pain Intensity 1: 5  Pain Location 1: Throat  Pain Intervention(s) 1: Medication (see MAR)    OBJECTIVE   Oral Motor:   · Labial: No impairment  · Dentition: Intact and Natural  · Oral Hygiene: Adequate  · Lingual: No impairment   · Uvula and tonsils red and swollen. Swallow evaluation:   Patient consumed trials of thin liquid by straw. 2 sips. Tilted head back and grimacing during swallow. No s/sx aspiration and voice clear. INTERDISCIPLINARY COLLABORATION: n/a  PRECAUTIONS/ALLERGIES: Patient has no known allergies. Tool Used: Dysphagia Outcome and Severity Scale (KELLY)    Score Comments   Normal Diet  [] 7 With no strategies or extra time needed   Functional Swallow  [] 6 May have mild oral or pharyngeal delay   Mild Dysphagia  [] 5 Which may require one diet consistency restricted    Mild-Moderate Dysphagia  [] 4 With 1-2 diet consistencies restricted   Moderate Dysphagia  [] 3 With 2 or more diet consistencies restricted   Moderate-Severe Dysphagia  [] 2 With partial PO strategies (trials with ST only)   Severe Dysphagia  [] 1 With inability to tolerate any PO safely      Score:  Initial: 3 Most Recent: 3 (Date 03/15/22 )   Interpretation of Tool: The Dysphagia Outcome and Severity Scale (KELLY) is a simple, easy-to-use, 7-point scale developed to systematically rate the functional severity of dysphagia based on objective assessment and make recommendations for diet level, independence level, and type of nutrition. Current Medications:   No current facility-administered medications on file prior to encounter. No current outpatient medications on file prior to encounter.        SAFETY:  After treatment position/precautions:  · Upright in bed  · Call light within reach    Total Treatment Duration:   Time In: 1233  Time Out: [address], [facility], [facility] [address], [address] [facility]

## 2022-03-15 NOTE — DISCHARGE INSTRUCTIONS
Patient Education        Mononucleosis: Care Instructions  Your Care Instructions     Mononucleosis, also called mono, is an infection that is usually caused by the Bill-Barr virus. Mono is spread through contact with saliva, mucus from the nose and throat, and sometimes tears or blood. You can get mono by kissing a person who is infected. Or you may get it by sharing a drinking glass or eating utensils with someone who has mono. That person may not be sick at the time or may have had mono long before. Mono may cause your spleen to swell. The spleen is an organ in the upper left side of your belly. A blow to the belly can cause a swollen spleen to break open. In very rare cases, the spleen may burst on its own. Most people recover fully after several weeks. But it may take several months before your normal energy is back. The lymph nodes in your neck may be larger than normal for up to 1 month. Getting lots of rest and keeping your schedule light will help you feel better. Time helps you recover. Follow-up care is a key part of your treatment and safety. Be sure to make and go to all appointments, and call your doctor if you are having problems. It's also a good idea to know your test results and keep a list of the medicines you take. How can you care for yourself at home? · Get plenty of rest. Stay in bed until you feel well enough to be up. · Drink plenty of fluids. If you have kidney, heart, or liver disease and have to limit fluids, talk with your doctor before you increase the amount of fluids you drink. · Take your medicines exactly as prescribed. Call your doctor if you think you are having a problem with your medicine. · For a sore throat, suck on lozenges or gargle with salt water. To make salt water, mix 1 teaspoon of salt in 8 ounces of warm water.   · Take an over-the-counter pain medicine, such as acetaminophen (Tylenol), ibuprofen (Advil, Motrin), or naproxen (Aleve), for a sore throat or headache or to lower a fever. Read and follow all instructions on the label. · Do not take two or more pain medicines at the same time unless the doctor told you to. Many pain medicines have acetaminophen, which is Tylenol. Too much acetaminophen (Tylenol) can be harmful. · Do not play contact sports for 4 weeks. Do not lift anything heavy. Too much activity increases the chance that your spleen may break open (rupture). · Try not to spread the virus to others. Do not kiss and don't share dishes, glasses, eating utensils, or toothbrushes for at least a few weeks. The virus is spread when saliva from an infected person gets in another person's mouth. It's hard to know how long you may be contagious. · If you know you have mono, do not donate blood. There is a chance of spreading the virus through blood products. When should you call for help? Call 911 anytime you think you may need emergency care. For example, call if:    · You passed out (lost consciousness). Call your doctor now or seek immediate medical care if:    · You have new or worse belly pain.     · You have signs of needing more fluids. You have sunken eyes and a dry mouth, and you pass only a little urine.     · You are dizzy or lightheaded, or you feel like you may faint.     · You cannot swallow fluids. Watch closely for changes in your health, and be sure to contact your doctor if:    · You do not get better as expected. Where can you learn more? Go to http://www.gray.com/  Enter G147 in the search box to learn more about \"Mononucleosis: Care Instructions. \"  Current as of: July 1, 2021               Content Version: 13.2  © 5977-8994 [employer]. Care instructions adapted under license by [employer] (which disclaims liability or warranty for this information).  If you have questions about a medical condition or this instruction, always ask your healthcare professional. [employer] disclaims any warranty or liability for your use of this information.

## 2022-03-15 NOTE — DISCHARGE SUMMARY
Hospitalist Discharge Summary   Admit Date:  3/12/2022  5:13 PM   DC Note date: 3/15/2022  Name:  Ldiia Wynn   Age:  27 y.o. Sex:  male  :  1995   MRN:  [id number]   Room:  [facility]  PCP:  None    Presenting Complaint: Nausea    Initial Admission Diagnosis: Intractable nausea and vomiting [R11.2]  Mononucleosis [B27.90]  Difficulty swallowing [R13.10]     Problem List for this Hospitalization:  Hospital Problems as of 3/15/2022 Date Reviewed: 3/15/2022          Codes Class Noted - Resolved POA    * (Principal) RESOLVED: Intractable nausea and vomiting ICD-10-CM: R11.2  ICD-9-CM: 536.2  3/12/2022 - 3/15/2022 Yes        Mononucleosis ICD-10-CM: B27.90  ICD-9-CM: 491  3/12/2022 - Present Yes        Elevated transaminase level ICD-10-CM: R74.01  ICD-9-CM: 790.4  3/12/2022 - Present Yes        Difficulty swallowing ICD-10-CM: R13.10  ICD-9-CM: 787.20  3/15/2022 - Present Yes            Did Patient have Sepsis (YES OR NO): No    Hospital Course:  26M PMHx recent diagnosis of mononucleosis who presented to ED with intractable nausea/vomiting.  Patient was diagnosed with mono last Saturday.  For the past 24 hours, he has not been able to keep any food/liquid down. Lubna Cotter reports myalgias and subjective fevers.  Upon ER workup, WBC is WNL.  LFTs are somewhat elevated.  CT shows expected mild splenomegaly. Amy Dominguez was consulted by ER, who advise this is not out of the realm of expected for mono and only would recommend General Surgery follow up if splenomegaly worsened. Deyvi Conor his persistent N/V, Hospitalist asked to admit.  Will observe for pain and nausea treatment. Disposition: Home or Self Care  Diet: ADULT DIET Full Liquid  Code Status: Full Code    Follow Up Orders: Follow-up Appointments   Procedures    FOLLOW UP VISIT Appointment in: One Week Follow up with primary care provider within seven days. Follow up with primary care provider within seven days.      Standing Status:   Standing     Number of Occurrences:   1     Order Specific Question:   Appointment in     Answer: One Week       Follow-up Information     Follow up With Specialties Details Why Contact Info    None    None ([phone]) Patient stated that they have no PCP          Time spent in patient discharge and coordination 42 minutes. Plan was discussed with patient, nurse, . All questions answered. Patient was stable at time of discharge. Instructions given to call a physician or return if any concerns. Discharge Info:   Current Discharge Medication List      START taking these medications    Details   HYDROcodone-acetaminophen (NORCO) 5-325 mg per tablet Take 1 Tablet by mouth every six (6) hours as needed for Pain for up to 3 days. Max Daily Amount: 4 Tablets. Qty: 12 Tablet, Refills: 0  Start date: 3/15/2022, End date: 3/18/2022    Associated Diagnoses: Infectious mononucleosis without complication, infectious mononucleosis due to unspecified organism      ketorolac (TORADOL) 10 mg tablet Take 1 Tablet by mouth every six (6) hours as needed for Pain for up to 5 days. Qty: 20 Tablet, Refills: 0  Start date: 3/15/2022, End date: 3/20/2022      ondansetron (ZOFRAN ODT) 4 mg disintegrating tablet Take 1 Tablet by mouth every eight (8) hours as needed for Nausea or Vomiting. Qty: 24 Tablet, Refills: 0  Start date: 3/15/2022             Procedures done this admission:  * No surgery found *    Consults this admission:  None    Echocardiogram/EKG results:  No results found for this or any previous visit. EKG Results     None          Diagnostic Imaging/Tests:   XR CHEST PA LAT    Result Date: 3/12/2022  EXAM: Chest x-ray. INDICATION: Dyspnea. COMPARISON: None. TECHNIQUE: Frontal and lateral view chest x-ray. FINDINGS: The lungs are clear. The cardiac size, mediastinal contour and pulmonary vasculature are normal. No pneumothorax or pleural effusion is seen. The bony structures are within normal limits.      Normal chest x-ray.    CT ABD PELV W CONT    Result Date: 3/12/2022  EXAM: CT abdomen and pelvis with IV contrast. INDICATION: Abdominal pain. COMPARISON: None. TECHNIQUE: Axial CT images of the abdomen and pelvis were obtained after the intravenous injection of 100 mL Isovue 370 CT contrast. Oral contrast was administered as well. Radiation dose reduction techniques were used for this study. Our CT scanners use one or all of the following:  Automated exposure control, adjustment of the mA or kV according to patient size, iterative reconstruction. FINDINGS: - Liver: Within normal limits. - Gallbladder and bile ducts: Postcholecystectomy, with no biliary tract dilatation. - Spleen: The spleen is mildly enlarged, measuring 13.5 cm in craniocaudal length, but otherwise normal in appearance. - Urinary tract: Within normal limits. - Adrenals: Within normal limits. - Pancreas: Within normal limits. - Gastrointestinal tract: Within normal limits. The appendix is not visualized. - Retroperitoneum: Within normal limits. - Peritoneal cavity and abdominal wall: Within normal limits. - Pelvis: Within normal limits. - Spine/bones: No acute process. - Other comments: There is minimal atelectasis or infiltrate in the medial right lower lobe. 1. Mild splenomegaly. 2. Prior cholecystectomy. 3. Minimal atelectasis or pneumonia in the right lung base.        All Micro Results     None          Labs: Results:       BMP, Mg, Phos Recent Labs     03/15/22  0814 03/13/22 0349 03/12/22  1721    136 135*   K 4.3 3.9 4.0    105 99   CO2 29 26 29   AGAP 5* 5* 7   BUN 7 7 7   CREA 0.76* 0.83 0.98   CA 9.3 8.9 9.3   GLU 96 97 110*   MG 2.3  --   --    PHOS 3.9  --   --       CBC Recent Labs     03/15/22  0814 03/13/22  0349 03/12/22  1721   WBC 7.9 9.5 10.8   RBC 4.69 4.87 5.20   HGB 14.2 14.8 15.8   HCT 41.6 43.1 46.0    237 267   GRANS 32* 27* 27*   LYMPH 56* 53* 59*   EOS 0* 0* 0*   MONOS 10 18* 10   BASOS 2 2 3*   IG 0 0 1   ANEU 2.5 2.6 2.9   ABL 4.4 5.0* 6.4*   SHERRI 0.0 0.0 0.0   ABM 0.8 1.7* 1.1   ABB 0.2 0.2 0.3*   AIG 0.0 0.0 0.1      LFT Recent Labs     03/15/22  0814 03/13/22  0349 03/12/22  1721   * 531* 665*   * 282* 336*   TP 6.7 6.6 7.6   ALB 2.9* 3.1* 3.5   GLOB 3.8* 3.5 4.1*   AGRAT 0.8* 0.9* 0.9*      Cardiac Testing No results found for: BNPP, BNP, CPK, RCK1, RCK2, RCK3, RCK4, CKMB, CKNDX, CKND1, TROPT, TROIQ   Coagulation Tests No results found for: PTP, INR, APTT, INREXT   A1c No results found for: HBA1C, MSX1YXFQ   Lipid Panel No results found for: CHOL, CHOLPOCT, CHOLX, CHLST, CHOLV, [id number], HDL, HDLP, LDL, LDLC, DLDLP, [id number], VLDLC, VLDL, TGLX, TRIGL, TRIGP, TGLPOCT, CHHD, CHHDX   Thyroid Panel No results found for: TSH, T4, FT4, TT3, T3U, TSHEXT     Most Recent UA Lab Results   Component Value Date/Time    WBC 0-3 03/12/2022 05:32 PM    RBC 5-10 03/12/2022 05:32 PM    Epithelial cells 0 03/12/2022 05:32 PM    Bacteria 0 03/12/2022 05:32 PM    Casts 0-3 03/12/2022 05:32 PM          All Labs from Last 24 Hrs:  Recent Results (from the past 24 hour(s))   METABOLIC PANEL, COMPREHENSIVE    Collection Time: 03/15/22  8:14 AM   Result Value Ref Range    Sodium 138 136 - 145 mmol/L    Potassium 4.3 3.5 - 5.1 mmol/L    Chloride 104 98 - 107 mmol/L    CO2 29 21 - 32 mmol/L    Anion gap 5 (L) 7 - 16 mmol/L    Glucose 96 65 - 100 mg/dL    BUN 7 6 - 23 MG/DL    Creatinine 0.76 (L) 0.8 - 1.5 MG/DL    GFR est AA >60 >60 ml/min/1.73m2    GFR est non-AA >60 >60 ml/min/1.73m2    Calcium 9.3 8.3 - 10.4 MG/DL    Bilirubin, total 0.5 0.2 - 1.1 MG/DL    ALT (SGPT) 349 (H) 12 - 65 U/L    AST (SGOT) 125 (H) 15 - 37 U/L    Alk.  phosphatase 305 (H) 50 - 136 U/L    Protein, total 6.7 6.3 - 8.2 g/dL    Albumin 2.9 (L) 3.5 - 5.0 g/dL    Globulin 3.8 (H) 2.3 - 3.5 g/dL    A-G Ratio 0.8 (L) 1.2 - 3.5     MAGNESIUM    Collection Time: 03/15/22  8:14 AM   Result Value Ref Range    Magnesium 2.3 1.8 - 2.4 mg/dL   PHOSPHORUS    Collection Time: 03/15/22  8:14 AM   Result Value Ref Range    Phosphorus 3.9 2.5 - 4.5 MG/DL   CBC WITH AUTOMATED DIFF    Collection Time: 03/15/22  8:14 AM   Result Value Ref Range    WBC 7.9 4.3 - 11.1 K/uL    RBC 4.69 4.23 - 5.6 M/uL    HGB 14.2 13.6 - 17.2 g/dL    HCT 41.6 41.1 - 50.3 %    MCV 88.7 79.6 - 97.8 FL    MCH 30.3 26.1 - 32.9 PG    MCHC 34.1 31.4 - 35.0 g/dL    RDW 11.5 (L) 11.9 - 14.6 %    PLATELET 909 674 - 816 K/uL    MPV 8.8 (L) 9.4 - 12.3 FL    ABSOLUTE NRBC 0.00 0.0 - 0.2 K/uL    NEUTROPHILS 32 (L) 43 - 78 %    LYMPHOCYTES 56 (H) 13 - 44 %    MONOCYTES 10 4.0 - 12.0 %    EOSINOPHILS 0 (L) 0.5 - 7.8 %    BASOPHILS 2 0.0 - 2.0 %    IMMATURE GRANULOCYTES 0 0.0 - 5.0 %    ABS. NEUTROPHILS 2.5 1.7 - 8.2 K/UL    ABS. LYMPHOCYTES 4.4 0.5 - 4.6 K/UL    ABS. MONOCYTES 0.8 0.1 - 1.3 K/UL    ABS. EOSINOPHILS 0.0 0.0 - 0.8 K/UL    ABS. BASOPHILS 0.2 0.0 - 0.2 K/UL    ABS. IMM.  GRANS. 0.0 0.0 - 0.5 K/UL    RBC COMMENTS SLIGHT  ANISOCYTOSIS + POIKILOCYTOSIS        RBC COMMENTS OCCASIONAL  STOMATOCYTES        WBC COMMENTS Result Confirmed By Smear      PLATELET COMMENTS ADEQUATE      DF AUTOMATED         Current Med List in Hospital:   Current Facility-Administered Medications   Medication Dose Route Frequency    ketorolac (TORADOL) tablet 10 mg  10 mg Oral Q6H PRN    phenol throat spray (CHLORASEPTIC) 1 Spray  1 Spray Oral PRN    lactated Ringers infusion  125 mL/hr IntraVENous CONTINUOUS    ibuprofen (MOTRIN) tablet 600 mg  600 mg Oral Q6H PRN    sodium chloride (NS) flush 5-10 mL  5-10 mL IntraVENous Q8H    sodium chloride (NS) flush 5-10 mL  5-10 mL IntraVENous PRN    sodium chloride (NS) flush 5-40 mL  5-40 mL IntraVENous Q8H    sodium chloride (NS) flush 5-40 mL  5-40 mL IntraVENous PRN    acetaminophen (TYLENOL) tablet 650 mg  650 mg Oral Q6H PRN    Or    acetaminophen (TYLENOL) suppository 650 mg  650 mg Rectal Q6H PRN    polyethylene glycol (MIRALAX) packet 17 g  17 g Oral DAILY PRN    ondansetron (ZOFRAN ODT) tablet 4 mg  4 mg Oral Q8H PRN    Or    ondansetron (ZOFRAN) injection 4 mg  4 mg IntraVENous Q6H PRN    HYDROcodone-acetaminophen (NORCO) 5-325 mg per tablet 1 Tablet  1 Tablet Oral Q6H PRN       No Known Allergies    There is no immunization history on file for this patient. Recent Vital Data:  Patient Vitals for the past 24 hrs:   Temp Pulse Resp BP SpO2   03/15/22 1054 99.1 °F (37.3 °C) 79 18 105/69 95 %   03/15/22 0704 98.2 °F (36.8 °C) 80 18 108/67 98 %   03/15/22 0258 (!) 100.8 °F (38.2 °C) 90 18 115/72 96 %   03/14/22 2301 (!) 101 °F (38.3 °C) 90 18 109/69 94 %   03/14/22 1901 (!) 100.6 °F (38.1 °C) 87 18 102/63 95 %   03/14/22 1532 99.7 °F (37.6 °C) 81 18 105/69 97 %     Oxygen Therapy  O2 Sat (%): 95 % (03/15/22 1054)  Pulse via Oximetry: 87 beats per minute (03/12/22 2100)  O2 Device: None (Room air) (03/13/22 1140)    Estimated body mass index is 19.8 kg/m² as calculated from the following:    Height as of this encounter: 5' 10\" (1.778 m). Weight as of this encounter: 62.6 kg (138 lb). Intake/Output Summary (Last 24 hours) at 3/15/2022 1409  Last data filed at 3/14/2022 1529  Gross per 24 hour   Intake 2989.58 ml   Output --   Net 2989.58 ml       Physical Exam  Vitals and nursing note reviewed. Constitutional:       General: He is not in acute distress. Appearance: Normal appearance. Comments: Affable   HENT:      Head: Normocephalic. Eyes:      Extraocular Movements: Extraocular movements intact. Cardiovascular:      Rate and Rhythm: Normal rate and regular rhythm. Pulses:           Radial pulses are 2+ on the left side. Pulmonary:      Effort: Pulmonary effort is normal. No respiratory distress. Musculoskeletal:         General: No deformity. Cervical back: No rigidity. Skin:     General: Skin is warm and dry. Neurological:      General: No focal deficit present. Mental Status: He is alert and oriented to person, place, and time. Psychiatric:         Mood and Affect: Mood normal.         Behavior: Behavior normal.           Signed:  Ted Pace MD

## 2022-03-15 NOTE — PROGRESS NOTES
Administered PRN Norco 5-325 mg PO see MAR. Had patient attempt to swallow pill whole, patient requested the pill to be cut in half to make smaller. Norco was cut in half. Patient attempted to swallow first half of pill and had difficulty and coughing, patient stated \"it hurts really bad\". Patient requested the second half to be \"crushed with cranberry juice\".

## 2022-03-16 LAB
HCV AB S/CO SERPL IA: <0.1 S/CO RATIO (ref 0–0.9)
HCV AB SERPL QL IA: NORMAL
HETEROPH AB SER QL LA: POSITIVE

## 2022-03-17 LAB
ALP BONE CFR SERPL: 26 % (ref 12–68)
ALP INTEST CFR SERPL: 2 % (ref 0–18)
ALP LIVER CFR SERPL: 72 % (ref 13–88)
ALP SERPL-CCNC: 278 IU/L (ref 44–121)